# Patient Record
Sex: MALE | Race: BLACK OR AFRICAN AMERICAN | Employment: OTHER | URBAN - METROPOLITAN AREA
[De-identification: names, ages, dates, MRNs, and addresses within clinical notes are randomized per-mention and may not be internally consistent; named-entity substitution may affect disease eponyms.]

---

## 2017-01-11 LAB — HCV AB SER IA-ACNC: NORMAL

## 2017-01-13 ENCOUNTER — OFFICE VISIT (OUTPATIENT)
Dept: INTERNAL MEDICINE CLINIC | Age: 62
End: 2017-01-13

## 2017-01-13 VITALS
WEIGHT: 248 LBS | OXYGEN SATURATION: 95 % | HEIGHT: 71 IN | DIASTOLIC BLOOD PRESSURE: 81 MMHG | SYSTOLIC BLOOD PRESSURE: 126 MMHG | RESPIRATION RATE: 18 BRPM | TEMPERATURE: 97.9 F | HEART RATE: 67 BPM | BODY MASS INDEX: 34.72 KG/M2

## 2017-01-13 DIAGNOSIS — Z23 ENCOUNTER FOR IMMUNIZATION: ICD-10-CM

## 2017-01-13 DIAGNOSIS — Z12.5 SCREENING PSA (PROSTATE SPECIFIC ANTIGEN): ICD-10-CM

## 2017-01-13 DIAGNOSIS — E66.09 NON MORBID OBESITY DUE TO EXCESS CALORIES: ICD-10-CM

## 2017-01-13 DIAGNOSIS — G47.33 OBSTRUCTIVE SLEEP APNEA SYNDROME: ICD-10-CM

## 2017-01-13 DIAGNOSIS — I10 ESSENTIAL HYPERTENSION, BENIGN: Primary | ICD-10-CM

## 2017-01-13 DIAGNOSIS — E78.00 PURE HYPERCHOLESTEROLEMIA: ICD-10-CM

## 2017-01-13 NOTE — PROGRESS NOTES
Chief Complaint   Patient presents with    Results     review    Hypertension     follow up    Cholesterol Problem     follow up    Hip Pain     left     Shoulder Pain     left       Patient is here for a follow up visit and lab review. He also complained of pain in the left hip and stated that he strained his lower abdomen. Stated that he strained it while pulling on a deer that he shot. 1. Have you been to the ER, urgent care clinic since your last visit? Hospitalized since your last visit? No    2. Have you seen or consulted any other health care providers outside of the [address] [place] [address] since your last visit? Include any pap smears or colon screening. Roxy    Tao Duarte is a 59 y.o. male who presents for routine immunizations. He denies any symptoms , reactions or allergies that would exclude them from being immunized today. Risks and adverse reactions were discussed and the VIS was given to them. All questions were addressed. He was observed for 5 min post injection. There were no reactions observed.     Suhail Pizarro LPN

## 2017-01-13 NOTE — PATIENT INSTRUCTIONS
Muscle Strain: Care Instructions  Your Care Instructions  A muscle strain happens when you overstretch, or pull, a muscle. It can happen when you exercise or lift something or when you have an accident. Rest and other home care can help the muscle heal.  Follow-up care is a key part of your treatment and safety. Be sure to make and go to all appointments, and call your doctor if you are having problems. Its also a good idea to know your test results and keep a list of the medicines you take. How can you care for yourself at home? · Rest the strained muscle. Do not put weight on it for a day or two. If your doctor advises you to, use crutches or a sling to rest a sore limb. · Put ice or a cold pack on the sore muscle for 10 to 20 minutes at a time to stop swelling. Put a thin cloth between the ice pack and your skin. · Prop up the sore arm or leg on a pillow when you ice it or anytime you sit or lie down during the next 3 days. Try to keep it above the level of your heart. This will help reduce swelling. · Take pain medicines exactly as directed. ¨ If the doctor gave you a prescription medicine for pain, take it as prescribed. ¨ If you are not taking a prescription pain medicine, ask your doctor if you can take an over-the-counter medicine. · Do not do anything that makes the pain worse. Return to exercise gradually as you feel better. When should you call for help? Call your doctor now or seek immediate medical care if:  · You have new severe pain. · Your injured limb is cool or pale or changes color. · You have tingling, weakness, or numbness in your injured limb. · You cannot move the injured area. Watch closely for changes in your health, and be sure to contact your doctor if:  · You cannot put weight on a joint, or it feels unsteady when you walk. · Pain and swelling get worse or do not start to get better after 2 days of home treatment. Where can you learn more?   Go to http://tristan-marina.info/. Enter O064 in the search box to learn more about \"Muscle Strain: Care Instructions. \"  Current as of: May 23, 2016  Content Version: 11.1  © 9400-6769 [email or web address]. Care instructions adapted under license by [employer] (which disclaims liability or warranty for this information). If you have questions about a medical condition or this instruction, always ask your healthcare professional. [place] [address] any warranty or liability for your use of this information. Vaccine Information Statement    Influenza (Flu) Vaccine (Inactivated or Recombinant): What you need to know    Many Vaccine Information Statements are available in [place] and other languages. See www.immunize.org/vis  Hojas de Información Sobre Vacunas están disponibles en Español y en muchos otros idiomas. Visite www.immunize.org/vis    1. Why get vaccinated? Influenza (flu) is a contagious disease that spreads around the [facility] every year, usually between October and May. Flu is caused by influenza viruses, and is spread mainly by coughing, sneezing, and close contact. Anyone can get flu. Flu strikes suddenly and can last several days. Symptoms vary by age, but can include:   fever/chills   sore throat   muscle aches   fatigue   cough   headache    runny or stuffy nose    Flu can also lead to pneumonia and blood infections, and cause diarrhea and seizures in children. If you have a medical condition, such as heart or lung disease, flu can make it worse. Flu is more dangerous for some people. Infants and young children, people 72years of age and older, pregnant women, and people with certain health conditions or a weakened immune system are at greatest risk. Each year thousands of people in the [facility] die from flu, and many more are hospitalized.      Flu vaccine can:   keep you from getting flu,   make flu less severe if you do get it, and   keep you from spreading flu to your family and other people. 2. Inactivated and recombinant flu vaccines    A dose of flu vaccine is recommended every flu season. Children 6 months through 6years of age may need two doses during the same flu season. Everyone else needs only one dose each flu season. Some inactivated flu vaccines contain a very small amount of a mercury-based preservative called thimerosal. Studies have not shown thimerosal in vaccines to be harmful, but flu vaccines that do not contain thimerosal are available. There is no live flu virus in flu shots. They cannot cause the flu. There are many flu viruses, and they are always changing. Each year a new flu vaccine is made to protect against three or four viruses that are likely to cause disease in the upcoming flu season. But even when the vaccine doesnt exactly match these viruses, it may still provide some protection    Flu vaccine cannot prevent:   flu that is caused by a virus not covered by the vaccine, or   illnesses that look like flu but are not. It takes about 2 weeks for protection to develop after vaccination, and protection lasts through the flu season. 3. Some people should not get this vaccine    Tell the person who is giving you the vaccine:     If you have any severe, life-threatening allergies. If you ever had a life-threatening allergic reaction after a dose of flu vaccine, or have a severe allergy to any part of this vaccine, you may be advised not to get vaccinated. Most, but not all, types of flu vaccine contain a small amount of egg protein.  If you ever had Guillain-Barré Syndrome (also called GBS). Some people with a history of GBS should not get this vaccine. This should be discussed with your doctor.  If you are not feeling well.     It is usually okay to get flu vaccine when you have a mild illness, but you might be asked to come back when you feel better. 4. Risks of a vaccine reaction    With any medicine, including vaccines, there is a chance of reactions. These are usually mild and go away on their own, but serious reactions are also possible. Most people who get a flu shot do not have any problems with it. Minor problems following a flu shot include:    soreness, redness, or swelling where the shot was given     hoarseness   sore, red or itchy eyes   cough   fever   aches   headache   itching   fatigue  If these problems occur, they usually begin soon after the shot and last 1 or 2 days. More serious problems following a flu shot can include the following:     There may be a small increased risk of Guillain-Barré Syndrome (GBS) after inactivated flu vaccine. This risk has been estimated at 1 or 2 additional cases per million people vaccinated. This is much lower than the risk of severe complications from flu, which can be prevented by flu vaccine.  Young children who get the flu shot along with pneumococcal vaccine (PCV13) and/or DTaP vaccine at the same time might be slightly more likely to have a seizure caused by fever. Ask your doctor for more information. Tell your doctor if a child who is getting flu vaccine has ever had a seizure. Problems that could happen after any injected vaccine:      People sometimes faint after a medical procedure, including vaccination. Sitting or lying down for about 15 minutes can help prevent fainting, and injuries caused by a fall. Tell your doctor if you feel dizzy, or have vision changes or ringing in the ears.  Some people get severe pain in the shoulder and have difficulty moving the arm where a shot was given. This happens very rarely.  Any medication can cause a severe allergic reaction. Such reactions from a vaccine are very rare, estimated at about 1 in a million doses, and would happen within a few minutes to a few hours after the vaccination.     As with any medicine, there is a very remote chance of a vaccine causing a serious injury or death. The safety of vaccines is always being monitored. For more information, visit: www.cdc.gov/vaccinesafety/    5. What if there is a serious reaction? What should I look for?  Look for anything that concerns you, such as signs of a severe allergic reaction, very high fever, or unusual behavior. Signs of a severe allergic reaction can include hives, swelling of the face and throat, difficulty breathing, a fast heartbeat, dizziness, and weakness - usually within a few minutes to a few hours after the vaccination. What should I do?  If you think it is a severe allergic reaction or other emergency that cant wait, call 9-1-1 and get the person to the nearest hospital. Otherwise, call your doctor.  Reactions should be reported to the Vaccine Adverse Event Reporting System (VAERS). Your doctor should file this report, or you can do it yourself through  the VAERS web site at www.vaers. hhs.gov, or by calling 7-308.265.4162. VAERS does not give medical advice. 6. The National Vaccine Injury Compensation Program    The [facility] Vaccine Injury Compensation Program (VICP) is a federal program that was created to compensate people who may have been injured by certain vaccines. Persons who believe they may have been injured by a vaccine can learn about the program and about filing a claim by calling 5-404.490.3502 or visiting the [employer] website at www.[facility].gov/vaccinecompensation. There is a time limit to file a claim for compensation. 7. How can I learn more?  Ask your healthcare provider. He or she can give you the vaccine package insert or suggest other sources of information.  Call your local or state health department.    Contact the Centers for Disease Control and Prevention (CDC):  - Call 6-718.586.9224 (1-800-CDC-INFO) or  - Visit CDCs website at www.cdc.gov/flu    Vaccine Information Statement Inactivated Influenza Vaccine   8/7/2015  42 BECKIE Morley [id number]    Department of Health and Human Services  Centers for Disease Control and Prevention    Office Use Only

## 2017-01-13 NOTE — MR AVS SNAPSHOT
Visit Information Date & Time Provider Department Dept. Phone Encounter #  
 1/13/2017  2:15 PM Adilia Parikh MD [facility] [phone] Follow-up Instructions Return in about 4 months (around 5/13/2017) for labs 1 week before. Your Appointments 5/16/2017  1:00 PM  
Follow Up with Germán Patrick DO [facility] ([facility]) Appt Note: 8 months [place] [id number]  
[phone] [address] Upcoming Health Maintenance Date Due INFLUENZA AGE 9 TO ADULT 8/1/2016 COLONOSCOPY 12/8/2024 DTaP/Tdap/Td series (2 - Td) 12/19/2024 Allergies as of 1/13/2017  Review Complete On: 1/13/2017 By: Adilia Parikh MD  
  
 Severity Noted Reaction Type Reactions Tetracycline  07/02/2010    Itching Current Immunizations  Reviewed on 6/1/2016 Name Date Influenza Vaccine 12/19/2014 Influenza Vaccine (Quad) PF 12/4/2015 Influenza Vaccine PF 11/1/2013 Tdap 12/19/2014 Zoster Vaccine, Live 5/31/2016 Not reviewed this visit You Were Diagnosed With   
  
 Codes Comments Essential hypertension, benign    -  Primary ICD-10-CM: I10 
ICD-9-CM: 401.1 Pure hypercholesterolemia     ICD-10-CM: E78.00 ICD-9-CM: 272.0 Obstructive sleep apnea syndrome     ICD-10-CM: G47.33 
ICD-9-CM: 327.23 Non morbid obesity due to excess calories     ICD-10-CM: E66.09 
ICD-9-CM: 278.00 Vitals BP Pulse Temp Resp Height(growth percentile) Weight(growth percentile) 126/81 (BP 1 Location: Left arm, BP Patient Position: Sitting) 67 97.9 °F (36.6 °C) (Oral) 18 5' 11\" (1.803 m) 248 lb (112.5 kg) SpO2 BMI Smoking Status 95% 34.59 kg/m2 Former Smoker Vitals History BMI and BSA Data Body Mass Index Body Surface Area 34.59 kg/m 2 2.37 m 2 Preferred Pharmacy Pharmacy Name Phone [address], [address] [facility] [phone] Your Updated Medication List  
  
   
This list is accurate as of: 1/13/17  2:40 PM.  Always use your most recent med list.  
  
  
  
  
 atorvastatin 80 mg tablet Commonly known as:  LIPITOR  
TAKE 1 TABLET BY MOUTH DAILY  
  
 fluticasone 50 mcg/actuation nasal spray Commonly known as:  FLONASE  
1 spray to each nostril daily  
  
 ibuprofen 600 mg tablet Commonly known as:  MOTRIN Take 600 mg by mouth as needed for Pain. lisinopril-hydroCHLOROthiazide 20-25 mg per tablet Commonly known as:  Kaitlyn Mcardle Take 1 Tab by mouth daily. Follow-up Instructions Return in about 4 months (around 5/13/2017) for labs 1 week before. Patient Instructions Muscle Strain: Care Instructions Your Care Instructions A muscle strain happens when you overstretch, or pull, a muscle. It can happen when you exercise or lift something or when you have an accident. Rest and other home care can help the muscle heal. 
Follow-up care is a key part of your treatment and safety. Be sure to make and go to all appointments, and call your doctor if you are having problems. Its also a good idea to know your test results and keep a list of the medicines you take. How can you care for yourself at home? · Rest the strained muscle. Do not put weight on it for a day or two. If your doctor advises you to, use crutches or a sling to rest a sore limb. · Put ice or a cold pack on the sore muscle for 10 to 20 minutes at a time to stop swelling. Put a thin cloth between the ice pack and your skin. · Prop up the sore arm or leg on a pillow when you ice it or anytime you sit or lie down during the next 3 days. Try to keep it above the level of your heart. This will help reduce swelling. · Take pain medicines exactly as directed. ¨ If the doctor gave you a prescription medicine for pain, take it as prescribed. ¨ If you are not taking a prescription pain medicine, ask your doctor if you can take an over-the-counter medicine. · Do not do anything that makes the pain worse. Return to exercise gradually as you feel better. When should you call for help? Call your doctor now or seek immediate medical care if: 
· You have new severe pain. · Your injured limb is cool or pale or changes color. · You have tingling, weakness, or numbness in your injured limb. · You cannot move the injured area. Watch closely for changes in your health, and be sure to contact your doctor if: 
· You cannot put weight on a joint, or it feels unsteady when you walk. · Pain and swelling get worse or do not start to get better after 2 days of home treatment. Where can you learn more? Go to http://tristan-marina.info/. Enter I717 in the search box to learn more about \"Muscle Strain: Care Instructions. \" Current as of: May 23, 2016 Content Version: 11.1 © 7298-6578 [employer]. Care instructions adapted under license by [employer] (which disclaims liability or warranty for this information). If you have questions about a medical condition or this instruction, always ask your healthcare professional. [address] any warranty or liability for your use of this information. Introducing [facility]! Dear Casey Fabian: Thank you for requesting a Vastech account. Our records indicate that you already have an active Vastech account. You can access your account anytime at [email or web address]. [email or web address] Did you know that you can access your hospital and ER discharge instructions at any time in Vastech? You can also review all of your test results from your hospital stay or ER visit. Additional Information If you have questions, please visit the Frequently Asked Questions section of the [facility] website at [email or web address] [email or web address] Remember, [facility] is NOT to be used for urgent needs. For medical emergencies, dial 911. Now available from your iPhone and Android! Please provide this summary of care documentation to your next provider. Your primary care clinician is listed as LAYA CALABRESE. If you have any questions after today's visit, please call [phone].

## 2017-01-13 NOTE — PROGRESS NOTES
Subjective:       Chief Complaint  The patient presents for follow up of hypertension and high cholesterol. Obesity, CAD         HPI  Tao Handy is a 59 y.o. male seen for follow up of hyperlipidemia. Healso has hypertension. Hypertension well controlled, no significant medication side effects noted, on Zestoretic, hyperlipidemia borderline controlled, no significant medication side effects noted, on Lipitor 80 mg but with pt having CAC score 331 he needs to get LDL<70. Pt has stopped tobacco use. Pt has been missing doses of his Lipitor. He will try be be more consistent with aim to get LDL<70. Diet and Lifestyle: not attempting to follow a low fat, low cholesterol diet, exercises sporadically    Home BP Monitoring: is not measured at home. Other symptoms and concerns: pt will continue work on losing weight to improve his health. Will continue to monitor pulmonary nodule on CT chest.      Discussed the patient's BMI with him. The BMI follow up plan is as follows: BMI is out of normal parameters and plan is as follows: I have counseled this patient on diet and exercise regimens. Current Outpatient Prescriptions   Medication Sig    atorvastatin (LIPITOR) 80 mg tablet TAKE 1 TABLET BY MOUTH DAILY    fluticasone (FLONASE) 50 mcg/actuation nasal spray 1 spray to each nostril daily    lisinopril-hydrochlorothiazide (PRINZIDE, ZESTORETIC) 20-25 mg per tablet Take 1 Tab by mouth daily.  ibuprofen (MOTRIN) 600 mg tablet Take 600 mg by mouth as needed for Pain. No current facility-administered medications for this visit.               Review of Systems  Respiratory: negative for dyspnea on exertion  Cardiovascular: negative for chest pain    Objective:     Visit Vitals    /81 (BP 1 Location: Left arm, BP Patient Position: Sitting)    Pulse 67    Temp 97.9 °F (36.6 °C) (Oral)    Resp 18    Ht 5' 11\" (1.803 m)    Wt 248 lb (112.5 kg)    SpO2 95%    BMI 34.59 kg/m2        General appearance - alert, well appearing, and in no distress  Neck - supple, no significant adenopathy, carotids upstroke normal bilaterally, no bruits  Chest - clear to auscultation, no wheezes, rales or rhonchi, symmetric air entry  Heart - normal rate, regular rhythm, normal S1, S2, no murmurs, rubs, clicks or gallops  Extremities - peripheral pulses normal, no pedal edema, no clubbing or cyanosis  Skin - normal coloration and turgor, no rashes, no suspicious skin lesions noted      Labs:   Lab Results   Component Value Date/Time    Cholesterol, total 184 01/10/2017 09:34 AM    HDL Cholesterol 42 01/10/2017 09:34 AM    LDL, calculated 122 01/10/2017 09:34 AM    Triglyceride 97 01/10/2017 09:34 AM    CHOL/HDL Ratio 3.1 12/04/2015 09:42 AM     Lab Results   Component Value Date/Time    ALT 32 01/10/2017 09:34 AM    AST 23 01/10/2017 09:34 AM    Alk. phosphatase 72 01/10/2017 09:34 AM    Bilirubin, total 0.5 01/10/2017 09:34 AM     Lab Results   Component Value Date/Time    GFR est AA >60 12/04/2015 09:42 AM    GFR est non-AA >60 12/04/2015 09:42 AM    Creatinine 0.9 01/10/2017 09:34 AM    BUN 14 01/10/2017 09:34 AM    Sodium 143 01/10/2017 09:34 AM    Potassium 4.1 01/10/2017 09:34 AM    Chloride 101 01/10/2017 09:34 AM    CO2 23 01/10/2017 09:34 AM            Assessment / Plan     Hypertension well controlled, on current meds  Hyperlipidemia borderline controlled, on Lipitor 80 mg daily. Pt will try to be as compliant as possible and improve diet and exercise. ICD-10-CM ICD-9-CM    1. Essential hypertension, benign M68 684.7 METABOLIC PANEL, COMPREHENSIVE   2. Pure hypercholesterolemia E78.00 272.0 LIPID PANEL   3. Non morbid obesity due to excess calories E66.09 278.00 Will try and improve with diet and increasing exercise. 4. Obstructive sleep apnea syndrome G47.33 327.23 Controlled on CPAP   5. Encounter for immunization Z23 V03.89 INFLUENZA VIRUS VAC QUAD,SPLIT,PRESV FREE SYRINGE 3/> YRS IM   6.  Screening PSA (prostate specific antigen) Z12.5 V76.44 PSA DIAGNOSTIC (PROSTATIC SPECIFIC AG)               Low cholesterol diet, weight control and daily exercise discussed. Follow-up Disposition:  Return in about 4 months (around 5/13/2017) for labs 1 week before. Reviewed plan of care. Patient has provided input and agrees with goals.

## 2017-01-15 DIAGNOSIS — Z12.5 SCREENING PSA (PROSTATE SPECIFIC ANTIGEN): ICD-10-CM

## 2017-01-23 RX ORDER — LISINOPRIL AND HYDROCHLOROTHIAZIDE 20; 25 MG/1; MG/1
TABLET ORAL
Qty: 90 TAB | Refills: 3 | Status: SHIPPED | OUTPATIENT
Start: 2017-01-23 | End: 2018-05-08 | Stop reason: SDUPTHER

## 2017-05-06 LAB — PSA SERPL-MCNC: 1.29 NG/ML

## 2017-05-16 ENCOUNTER — OFFICE VISIT (OUTPATIENT)
Dept: CARDIOLOGY CLINIC | Age: 62
End: 2017-05-16

## 2017-05-16 VITALS
DIASTOLIC BLOOD PRESSURE: 80 MMHG | BODY MASS INDEX: 35.28 KG/M2 | WEIGHT: 252 LBS | OXYGEN SATURATION: 97 % | HEART RATE: 62 BPM | SYSTOLIC BLOOD PRESSURE: 136 MMHG | HEIGHT: 71 IN

## 2017-05-16 DIAGNOSIS — I10 ESSENTIAL HYPERTENSION, BENIGN: Primary | ICD-10-CM

## 2017-05-16 DIAGNOSIS — E78.00 PURE HYPERCHOLESTEROLEMIA: ICD-10-CM

## 2017-05-16 DIAGNOSIS — I71.20 THORACIC AORTIC ANEURYSM WITHOUT RUPTURE: ICD-10-CM

## 2017-05-16 NOTE — PROGRESS NOTES
HPI: I saw Kaela Blantonch in my office today in cardiovascular evaluation regarding an abnormal CT of the chest that he's had recently. Mr. Abisai Jiang is a pleasant 59year old gentleman with history of hypertension, hyperlipidemia, and an abnormal coronary artery calcium score at 331 completed recently, along with some mild ascending aortic dilatation. He has historically been a smoker, but quit smoking within the last couple of months. He did have a CT scan on 12/7/2015 and again on 12/23/2015. The initial CT was for a calcium score which was 331, and the second CT was for further evaluation of some chest nodules. Both CTs demonstrated mildly dilated ascending aorta at 4.3 x 4.3 cm at largest dimension, suggesting a very early ascending aortic aneurysm. He has some mild hypercholesterolemia, for which he has been on Lipitor, but his latest lipid profile completed on 5/8/2017 showed an LDL of 102 and in view of his moderately elevated calcium score we would certainly like to see it much lower preferably under 70 if possible. He tells me that he has been taking his Lipitor 80 mg daily most days, but has not been very careful about his diet. He has some pain in his hands possibly from early arthritis, but is really not having any cardiovascular symptomatology. Encounter Diagnoses   Name Primary?  Pure hypercholesterolemia     Essential hypertension, benign Yes    Thoracic aortic aneurysm without rupture ([address]        Discussion: This gentleman appears to be doing reasonably well from a cardiovascular vantage. He is not having any symptoms to suggest development of symptomatic obstructive coronary disease or any heart failure issues.     He does have history of an ascending aortic aneurysm which is only about 4.3 cm when last checked in his CT in July 2016 and I do not think we have to check that again at this time since it really had not changed in about 8 months from his previous CT, but I am going to discuss possibly doing an MRI or CT when I see him again in about 8 months to reevaluate this area. He does have significant hypercholesterolemia as indicated above and I have encouraged him to follow-up with Dr. Jorge Leonardo and to further discuss adjustment of his diet and getting involved in some exercise and if that does not improve significantly when it is next checked I would consider adding Zetia to his regimen. His blood pressure appears to be adequately controlled and his EKG is completely normal so I am going to plan to see him again in several months or as needed if new symptoms surface in the interim. PCP: Amie Zuñiga MD      Past Medical History:   Diagnosis Date    Agatston CAC score 200-399 12/03/2015    Coronary calcium score 331.  Essential hypertension, benign     Pure hypercholesterolemia     Tobacco dependency        Past Surgical History:   Procedure Laterality Date    HX OTHER SURGICAL Right 2011    bone spur       Current Outpatient Rx   Name  Route  Sig  Dispense  Refill    lisinopril-hydrochlorothiazide (PRINZIDE, ZESTORETIC) 20-25 mg per tablet    Oral    Take 1 Tab by mouth daily. 90 Tab    3      atorvastatin (LIPITOR) 80 mg tablet    Oral    Take 1 Tab by mouth daily. 90 Tab    3      ibuprofen (MOTRIN) 600 mg tablet    Oral    Take 600 mg by mouth as needed for Pain.               fluticasone (FLONASE) 50 mcg/actuation nasal spray        1 spray to each nostril daily    1 Bottle    3         Allergies   Allergen Reactions    Tetracycline Itching       Social History :  Social History   Substance Use Topics    Smoking status: Former Smoker     Packs/day: 1.00     Years: 20.00     Types: Cigarettes     Quit date: 1/2/2016    Smokeless tobacco: Never Used    Alcohol use Yes      Comment: occassionally        Family History: family history includes Diabetes in his brother and father; Heart Attack in his brother; Heart Failure in his father; Kidney Disease in his mother; Stroke in his brother. Review of Systems:    Constitutional: Negative. Respiratory: Negative. Cardiovascular: Negative. Gastrointestinal: Negative. Musculoskeletal: Positive for joint pain and myalgias. Negative for back pain, falls and neck pain. Physical Exam:    The patient is a cooperative, alert, well developed, well nourished 59 y.o. dark skinned  male who is in no acute distress at the time of the examination. Visit Vitals    /80    Pulse 62    Ht 5' 11\" (1.803 m)    Wt 114.3 kg (252 lb)    SpO2 97%    BMI 35.15 kg/m2       HEENT: Conjuctiva white, mucosa moist, no pallor or cyanosis. NECK: Supple without masses, tenderness or thyromegaly. There was no jugular venous distention. Carotid are full bilaterally without bruits. CHEST: Symmetrical with good excursion. LUNGS: Clear to auscultation in all fields. HEART: The apex is not displaced. There were no lifts, thrills or heaves. There is a normal S1 and S2 without appreciable murmurs, rubs, clicks, or gallops auscultated. ABDOMEN: Soft without masses, tenderness or organomegaly. EXTREMITIES: Full peripheral pulses without peripheral edema. INTEGUMENT: Warm and dry   NEUROLOGICAL: The patient is oriented x 3 with motor function grossly intact. Review of Data: See PMH and Cardiology and Imaging sections for cardiac testing  Lab Results   Component Value Date/Time    Cholesterol, total 159 05/05/2017 08:54 AM    HDL Cholesterol 41 05/05/2017 08:54 AM    LDL, calculated 102 05/05/2017 08:54 AM    Triglyceride 78 05/05/2017 08:54 AM    CHOL/HDL Ratio 3.1 12/04/2015 09:42 AM       Results for orders placed or performed in visit on 05/16/17   AMB POC EKG ROUTINE W/ 12 LEADS, INTER & REP     Status: None    Narrative    Normal sinus rhythm rate 62. This EKG is within normal limits and similar to the EKG of September 19, 2016. Clemente Davis D.O., F.A.C.C.   [facility]  [facility] [place]  [address] [facility]. [address] [address] [address]    PLEASE NOTE:  This document has been produced using voice recognition software. Unrecognized errors in transcription may be present.

## 2017-05-16 NOTE — PROGRESS NOTES
Review of Systems   Constitutional: Negative. Respiratory: Negative. Cardiovascular: Negative. Gastrointestinal: Negative. Musculoskeletal: Positive for joint pain and myalgias. Negative for back pain, falls and neck pain.

## 2017-05-16 NOTE — MR AVS SNAPSHOT
Visit Information Date & Time Provider Department Dept. Phone Encounter #  
 5/16/2017  1:00 PM Matteo Lazaro, [address] [facility] [id number] Follow-up Instructions Return in about 8 months (around 1/16/2018), or if symptoms worsen or fail to improve. Your Appointments 5/19/2017  8:30 AM  
ROUTINE CARE with Lacey Batres MD  
[facility]ists at [facility] (--) Appt Note: 4 mo f/u  
 [address] [place] [id number] [phone]  
  
   
 [address] [address] Upcoming Health Maintenance Date Due INFLUENZA AGE 9 TO ADULT 8/1/2017 COLONOSCOPY 12/8/2024 DTaP/Tdap/Td series (2 - Td) 12/19/2024 Allergies as of 5/16/2017  Review Complete On: 5/16/2017 By: Matteo Lazaro DO Severity Noted Reaction Type Reactions Tetracycline  07/02/2010    Itching Current Immunizations  Reviewed on 6/1/2016 Name Date Influenza Vaccine 12/19/2014 Influenza Vaccine (Quad) PF 1/13/2017  2:52 PM, 12/4/2015 Influenza Vaccine PF 11/1/2013 Tdap 12/19/2014 Zoster Vaccine, Live 5/31/2016 Not reviewed this visit You Were Diagnosed With   
  
 Codes Comments Essential hypertension, benign    -  Primary ICD-10-CM: I10 
ICD-9-CM: 401.1 Pure hypercholesterolemia     ICD-10-CM: E78.00 ICD-9-CM: 272.0 Vitals BP Pulse Height(growth percentile) Weight(growth percentile) SpO2 BMI  
 136/80 62 5' 11\" (1.803 m) 252 lb (114.3 kg) 97% 35.15 kg/m2 Smoking Status Former Smoker Vitals History BMI and BSA Data Body Mass Index Body Surface Area  
 35.15 kg/m 2 2.39 m 2 Preferred Pharmacy Pharmacy Name Phone [address], [address] [place] [address] [phone] Your Updated Medication List  
  
   
This list is accurate as of: 5/16/17  1:36 PM.  Always use your most recent med list.  
  
  
  
  
 atorvastatin 80 mg tablet Commonly known as:  LIPITOR  
TAKE 1 TABLET BY MOUTH DAILY  
  
 fluticasone 50 mcg/actuation nasal spray Commonly known as:  FLONASE  
1 spray to each nostril daily  
  
 ibuprofen 600 mg tablet Commonly known as:  MOTRIN Take 600 mg by mouth as needed for Pain. lisinopril-hydroCHLOROthiazide 20-25 mg per tablet Commonly known as:  PRINZIDE, ZESTORETIC  
TAKE 1 TABLET BY MOUTH DAILY We Performed the Following AMB POC EKG ROUTINE W/ 12 LEADS, INTER & REP [85084 CPT(R)] Follow-up Instructions Return in about 8 months (around 1/16/2018), or if symptoms worsen or fail to improve. Introducing [facility] & HEALTH SERVICES! Dear Navin Ambrocio: Thank you for requesting a SnappyTV account. Our records indicate that you already have an active SnappyTV account. You can access your account anytime at [email or web address] Did you know that you can access your hospital and ER discharge instructions at any time in SnappyTV? You can also review all of your test results from your hospital stay or ER visit. Additional Information If you have questions, please visit the Frequently Asked Questions section of the SnappyTV website at [email or web address] Remember, SnappyTV is NOT to be used for urgent needs. For medical emergencies, dial 911. Now available from your iPhone and Android! Please provide this summary of care documentation to your next provider. Your primary care clinician is listed as LAYA CALABRESE. If you have any questions after today's visit, please call [phone].

## 2017-05-16 NOTE — PROGRESS NOTES
1. Have you been to the ER, urgent care clinic since your last visit? Hospitalized since your last visit? No     2. Have you seen or consulted any other health care providers outside of the [facility] since your last visit? Include any pap smears or colon screening.  No

## 2017-05-19 ENCOUNTER — OFFICE VISIT (OUTPATIENT)
Dept: INTERNAL MEDICINE CLINIC | Age: 62
End: 2017-05-19

## 2017-05-19 VITALS
BODY MASS INDEX: 35 KG/M2 | SYSTOLIC BLOOD PRESSURE: 132 MMHG | HEART RATE: 75 BPM | DIASTOLIC BLOOD PRESSURE: 74 MMHG | WEIGHT: 250 LBS | OXYGEN SATURATION: 98 % | HEIGHT: 71 IN | TEMPERATURE: 98.7 F | RESPIRATION RATE: 20 BRPM

## 2017-05-19 DIAGNOSIS — E66.09 NON MORBID OBESITY DUE TO EXCESS CALORIES: ICD-10-CM

## 2017-05-19 DIAGNOSIS — E78.00 PURE HYPERCHOLESTEROLEMIA: ICD-10-CM

## 2017-05-19 DIAGNOSIS — I10 ESSENTIAL HYPERTENSION, BENIGN: Primary | ICD-10-CM

## 2017-05-19 NOTE — MR AVS SNAPSHOT
Visit Information Date & Time Provider Department Dept. Phone Encounter #  
 5/19/2017  8:30 AM Robbie Erazo MD [facility] at [facility] [employer] [id number] Follow-up Instructions Return in about 4 months (around 9/19/2017) for labs 1 week before. Your Appointments 5/31/2017  4:10 PM  
Follow Up with Nikia Joyner MD  
[address] [facility], [address] and [facility] ([address]) Appt Note: lt shoulder pain [address], [address] [facility]  
[phone] [address] [place]  
  
    
 1/8/2018  1:20 PM  
Follow Up with Masha Marley DO [facility] ([address]) Appt Note: 8 mth f/up [place] [address]  
[phone] [address] [facility] [address] Upcoming Health Maintenance Date Due INFLUENZA AGE 9 TO ADULT 8/1/2017 COLONOSCOPY 12/8/2024 DTaP/Tdap/Td series (2 - Td) 12/19/2024 Allergies as of 5/19/2017  Review Complete On: 5/19/2017 By: Robbie Erazo MD  
  
 Severity Noted Reaction Type Reactions Tetracycline  07/02/2010    Itching Current Immunizations  Reviewed on 6/1/2016 Name Date Influenza Vaccine 12/19/2014 Influenza Vaccine (Quad) PF 1/13/2017  2:52 PM, 12/4/2015 Influenza Vaccine PF 11/1/2013 Tdap 12/19/2014 Zoster Vaccine, Live 5/31/2016 Not reviewed this visit You Were Diagnosed With   
  
 Codes Comments Essential hypertension, benign    -  Primary ICD-10-CM: I10 
ICD-9-CM: 401.1 Pure hypercholesterolemia     ICD-10-CM: E78.00 ICD-9-CM: 272.0 Vitals BP Pulse Temp Resp Height(growth percentile) Weight(growth percentile) 132/74 (BP 1 Location: Left arm, BP Patient Position: Sitting) 75 98.7 °F (37.1 °C) (Oral) 20 5' 11\" (1.803 m) 250 lb (113.4 kg) SpO2 BMI Smoking Status 98% 34.87 kg/m2 Former Smoker Vitals History BMI and BSA Data Body Mass Index Body Surface Area 34.87 kg/m 2 2.38 m 2 Preferred Pharmacy Pharmacy Name Phone [address], [address] [facility] [phone] Your Updated Medication List  
  
   
This list is accurate as of: 5/19/17  9:12 AM.  Always use your most recent med list.  
  
  
  
  
 atorvastatin 80 mg tablet Commonly known as:  LIPITOR  
TAKE 1 TABLET BY MOUTH DAILY  
  
 fluticasone 50 mcg/actuation nasal spray Commonly known as:  FLONASE  
1 spray to each nostril daily  
  
 ibuprofen 600 mg tablet Commonly known as:  MOTRIN Take 600 mg by mouth as needed for Pain. lisinopril-hydroCHLOROthiazide 20-25 mg per tablet Commonly known as:  PRINZIDE, ZESTORETIC  
TAKE 1 TABLET BY MOUTH DAILY Follow-up Instructions Return in about 4 months (around 9/19/2017) for labs 1 week before. Patient Instructions Advance Care Planning: Care Instructions Your Care Instructions It can be hard to live with an illness that cannot be cured. But if your health is getting worse, you may want to make decisions about end-of-life care. Planning for the end of your life does not mean that you are giving up. It is a way to make sure that your wishes are met. Clearly stating your wishes can make it easier for your loved ones. Making plans while you are still able may also ease your mind and make your final days less stressful and more meaningful. Follow-up care is a key part of your treatment and safety. Be sure to make and go to all appointments, and call your doctor if you are having problems. It's also a good idea to know your test results and keep a list of the medicines you take. What can you do to plan for the end of life? · You can bring these issues up with your doctor.  You do not need to wait until your doctor starts the conversation. You might start with \"I would not be willing to live with . Shelvy Setting Shelvy Setting Shelvy Setting \" When you complete this sentence it helps your doctor understand your wishes. · Talk openly and honestly with your doctor. This is the best way to understand the decisions you will need to make as your health changes. Know that you can always change your mind. · Ask your doctor about commonly used life-support measures. These include tube feedings, breathing machines, and fluids given through a vein (IV). Understanding these treatments will help you decide whether you want them. · You may choose to have these life-supporting treatments for a limited time. This allows a trial period to see whether they will help you. You may also decide that you want your doctor to take only certain measures to keep you alive. It is important to spell out these conditions so that your doctor and family understand them. · Talk to your doctor about how long you are likely to live. He or she may be able to give you an idea of what usually happens with your specific illness. · Think about preparing papers that state your wishes. This way there will not be any confusion about what you want. You can change your instructions at any time. Which papers should you prepare? Advance directives are legal papers that tell doctors how you want to be cared for at the end of your life. You do not need a  to write these papers. Ask your doctor or your state health department for information on how to write your advance directives. They may have the forms for each of these types of papers. Make sure your doctor has a copy of these on file, and give a copy to a family member or close friend. · Consider a do-not-resuscitate order (DNR). This order asks that no extra treatments be done if your heart stops or you stop breathing.  Extra treatments may include cardiopulmonary resuscitation (CPR), electrical shock to restart your heart, or a machine to breathe for you. If you decide to have a DNR order, ask your doctor to explain and write it. Place the order in your home where everyone can easily see it. · Consider a living will. A living will explains your wishes about life support and other treatments at the end of your life if you become unable to speak for yourself. Living boyle tell doctors to use or not use treatments that would keep you alive. You must have one or two witnesses or a notary present when you sign this form. · Consider a durable power of  for health care. This allows you to name a person to make decisions about your care if you are not able to. Most people ask a close friend or family member. Talk to this person about the kinds of treatments you want and those that you do not want. Make sure this person understands your wishes. These legal papers are simple to change. Tell your doctor what you want to change, and ask him or her to make a note in your medical file. Give your family updated copies of the papers. Where can you learn more? Go to http://tristan-marina.info/. Enter P184 in the search box to learn more about \"Advance Care Planning: Care Instructions. \" Current as of: November 17, 2016 Content Version: 11.2 © 8167-5073 [employer]. Care instructions adapted under license by [employer] (which disclaims liability or warranty for this information). If you have questions about a medical condition or this instruction, always ask your healthcare professional. [place] [address] any warranty or liability for your use of this information. Introducing hospitals & HEALTH SERVICES! Dear Reggie Burks: Thank you for requesting a Warp 9 account. Our records indicate that you already have an active Warp 9 account. You can access your account anytime at [email or web address] Did you know that you can access your hospital and ER discharge instructions at any time in [email or web address]? You can also review all of your test results from your hospital stay or ER visit. Additional Information If you have questions, please visit the Frequently Asked Questions section of the [email or web address] website at [email or web address] Remember, [email or web address] is NOT to be used for urgent needs. For medical emergencies, dial 911. Now available from your iPhone and Android! Please provide this summary of care documentation to your next provider. Your primary care clinician is listed as LAYA CALABRESE. If you have any questions after today's visit, please call [phone].

## 2017-05-19 NOTE — PATIENT INSTRUCTIONS
Advance Care Planning: Care Instructions  Your Care Instructions  It can be hard to live with an illness that cannot be cured. But if your health is getting worse, you may want to make decisions about end-of-life care. Planning for the end of your life does not mean that you are giving up. It is a way to make sure that your wishes are met. Clearly stating your wishes can make it easier for your loved ones. Making plans while you are still able may also ease your mind and make your final days less stressful and more meaningful. Follow-up care is a key part of your treatment and safety. Be sure to make and go to all appointments, and call your doctor if you are having problems. It's also a good idea to know your test results and keep a list of the medicines you take. What can you do to plan for the end of life? · You can bring these issues up with your doctor. You do not need to wait until your doctor starts the conversation. You might start with \"I would not be willing to live with . Arash Jacksno \" When you complete this sentence it helps your doctor understand your wishes. · Talk openly and honestly with your doctor. This is the best way to understand the decisions you will need to make as your health changes. Know that you can always change your mind. · Ask your doctor about commonly used life-support measures. These include tube feedings, breathing machines, and fluids given through a vein (IV). Understanding these treatments will help you decide whether you want them. · You may choose to have these life-supporting treatments for a limited time. This allows a trial period to see whether they will help you. You may also decide that you want your doctor to take only certain measures to keep you alive. It is important to spell out these conditions so that your doctor and family understand them. · Talk to your doctor about how long you are likely to live.  He or she may be able to give you an idea of what usually happens with your specific illness. · Think about preparing papers that state your wishes. This way there will not be any confusion about what you want. You can change your instructions at any time. Which papers should you prepare? Advance directives are legal papers that tell doctors how you want to be cared for at the end of your life. You do not need a  to write these papers. Ask your doctor or your state health department for information on how to write your advance directives. They may have the forms for each of these types of papers. Make sure your doctor has a copy of these on file, and give a copy to a family member or close friend. · Consider a do-not-resuscitate order (DNR). This order asks that no extra treatments be done if your heart stops or you stop breathing. Extra treatments may include cardiopulmonary resuscitation (CPR), electrical shock to restart your heart, or a machine to breathe for you. If you decide to have a DNR order, ask your doctor to explain and write it. Place the order in your home where everyone can easily see it. · Consider a living will. A living will explains your wishes about life support and other treatments at the end of your life if you become unable to speak for yourself. Living boyle tell doctors to use or not use treatments that would keep you alive. You must have one or two witnesses or a notary present when you sign this form. · Consider a durable power of  for health care. This allows you to name a person to make decisions about your care if you are not able to. Most people ask a close friend or family member. Talk to this person about the kinds of treatments you want and those that you do not want. Make sure this person understands your wishes. These legal papers are simple to change. Tell your doctor what you want to change, and ask him or her to make a note in your medical file. Give your family updated copies of the papers.   Where can you learn more?  Go to http://tristan-marina.info/. Enter P184 in the search box to learn more about \"Advance Care Planning: Care Instructions. \"  Current as of: November 17, 2016  Content Version: 11.2  © 1789-1604 [employer]. Care instructions adapted under license by [employer] (which disclaims liability or warranty for this information). If you have questions about a medical condition or this instruction, always ask your healthcare professional. [address] any warranty or liability for your use of this information.

## 2017-05-19 NOTE — PROGRESS NOTES
Subjective:       Chief Complaint  The patient presents for follow up of hypertension and high cholesterol. Obesity, CAD         HPI  Tao Carroll is a 59 y.o. male seen for follow up of hyperlipidemia. Healso has hypertension. Hypertension well controlled, no significant medication side effects noted, on Zestoretic, hyperlipidemia borderline controlled, no significant medication side effects noted, on Lipitor 80 mg but with pt having CAC score 331 he needs to get LDL<70. Pt has stopped tobacco use. Pt has been missing doses of his Lipitor. He will try be be more consistent with aim to get LDL<70. He was advised to get a pill counter. Diet and Lifestyle: not attempting to follow a low fat, low cholesterol diet, exercises sporadically    Home BP Monitoring: is not measured at home. Other symptoms and concerns: pt will continue work on losing weight to improve his health. Discussed the patient's BMI with him. The BMI follow up plan is as follows: BMI is out of normal parameters and plan is as follows: I have counseled this patient on diet and exercise regimens. Current Outpatient Prescriptions   Medication Sig    lisinopril-hydroCHLOROthiazide (PRINZIDE, ZESTORETIC) 20-25 mg per tablet TAKE 1 TABLET BY MOUTH DAILY    atorvastatin (LIPITOR) 80 mg tablet TAKE 1 TABLET BY MOUTH DAILY    fluticasone (FLONASE) 50 mcg/actuation nasal spray 1 spray to each nostril daily    ibuprofen (MOTRIN) 600 mg tablet Take 600 mg by mouth as needed for Pain. No current facility-administered medications for this visit.               Review of Systems  Respiratory: negative for dyspnea on exertion  Cardiovascular: negative for chest pain    Objective:     Visit Vitals    /74 (BP 1 Location: Left arm, BP Patient Position: Sitting)    Pulse 75    Temp 98.7 °F (37.1 °C) (Oral)    Resp 20    Ht 5' 11\" (1.803 m)    Wt 250 lb (113.4 kg)    SpO2 98%    BMI 34.87 kg/m2        General appearance - alert, well appearing, and in no distress  Neck - supple, no significant adenopathy, carotids upstroke normal bilaterally, no bruits  Chest - clear to auscultation, no wheezes, rales or rhonchi, symmetric air entry  Heart - normal rate, regular rhythm, normal S1, S2, no murmurs, rubs, clicks or gallops  Extremities - peripheral pulses normal, no pedal edema, no clubbing or cyanosis  Skin - normal coloration and turgor, no rashes, no suspicious skin lesions noted      Labs:   Lab Results   Component Value Date/Time    Cholesterol, total 159 05/05/2017 08:54 AM    HDL Cholesterol 41 05/05/2017 08:54 AM    LDL, calculated 102 05/05/2017 08:54 AM    Triglyceride 78 05/05/2017 08:54 AM    CHOL/HDL Ratio 3.1 12/04/2015 09:42 AM     Lab Results   Component Value Date/Time    ALT (SGPT) 30 05/05/2017 08:54 AM    AST (SGOT) 30 05/05/2017 08:54 AM    Alk. phosphatase 74 05/05/2017 08:54 AM    Bilirubin, total 0.6 05/05/2017 08:54 AM     Lab Results   Component Value Date/Time    GFR est AA >60 12/04/2015 09:42 AM    GFR est non-AA >60 12/04/2015 09:42 AM    Creatinine 1.1 05/05/2017 08:54 AM    BUN 16 05/05/2017 08:54 AM    Sodium 142 05/05/2017 08:54 AM    Potassium 3.7 05/05/2017 08:54 AM    Chloride 99 05/05/2017 08:54 AM    CO2 21 05/05/2017 08:54 AM            Assessment / Plan     Hypertension well controlled, on current meds  Hyperlipidemia borderline controlled, on Lipitor 80 mg daily. Pt will try to be as compliant as possible and improve diet and exercise. ICD-10-CM ICD-9-CM    1. Essential hypertension, benign D99 571.5 METABOLIC PANEL, COMPREHENSIVE   2. Pure hypercholesterolemia E78.00 272.0 LIPID PANEL   3. Non morbid obesity due to excess calories E66.09 278.00 Pt will continue to work on diet and exercise to lose weight. Low cholesterol diet, weight control and daily exercise discussed. Follow-up Disposition:  Return in about 4 months (around 9/19/2017) for labs 1 week before.       Reviewed plan of care. Patient has provided input and agrees with goals.

## 2017-05-31 ENCOUNTER — OFFICE VISIT (OUTPATIENT)
Dept: ORTHOPEDIC SURGERY | Age: 62
End: 2017-05-31

## 2017-05-31 VITALS
HEIGHT: 71 IN | DIASTOLIC BLOOD PRESSURE: 70 MMHG | BODY MASS INDEX: 35.59 KG/M2 | SYSTOLIC BLOOD PRESSURE: 121 MMHG | WEIGHT: 254.2 LBS | HEART RATE: 60 BPM | TEMPERATURE: 97.8 F

## 2017-05-31 DIAGNOSIS — M19.012 OSTEOARTHRITIS OF LEFT AC (ACROMIOCLAVICULAR) JOINT: Primary | ICD-10-CM

## 2017-05-31 RX ORDER — TRIAMCINOLONE ACETONIDE 40 MG/ML
40 INJECTION, SUSPENSION INTRA-ARTICULAR; INTRAMUSCULAR ONCE
Qty: 1 ML | Refills: 0
Start: 2017-05-31 | End: 2017-05-31

## 2017-05-31 NOTE — PROGRESS NOTES
Tao Leyva  1955   Chief Complaint   Patient presents with    Shoulder Pain     Left        HISTORY OF PRESENT ILLNESS  Edith Tobias is a 59 y.o. male who presents today for reevaluation of left shoulder pain. He rates his pain 0/10 today. [facility]. He is requesting a cortisone injection today. He is not ready to consider surgery. He does not have any pain reaching overhead. He states he still has occasional pain but received a great deal of relief from the cortisone injection. Patient denies any fever, chills, chest pain, shortness of breath or calf pain. There are no new illness or injuries to report since last seen in the office. There are no changes to medications, allergies, family or social history. PHYSICAL EXAM:   Visit Vitals    /70    Pulse 60    Temp 97.8 °F (36.6 °C) (Oral)    Ht 5' 11\" (1.803 m)    Wt 254 lb 3.2 oz (115.3 kg)    BMI 35.45 kg/m2     The patient is a well-developed, well-nourished male   in no acute distress. The patient is alert and oriented times three. The patient is alert and oriented times three. Mood and affect are normal.  LYMPHATIC: lymph nodes are not enlarged and are within normal limits  SKIN: normal in color and non tender to palpation. There are no bruises or abrasions noted. NEUROLOGICAL: Motor sensory exam is within normal limits. Reflexes are equal bilaterally.  There is normal sensation to pinprick and light touch  MUSCULOSKELETAL:  Examination Left shoulder   Skin Intact   AC joint tenderness -   Biceps tenderness -   Forward flexion/Elevation    Active abduction    Glenohumeral abduction 90   External rotation ROM 90   Internal rotation ROM 70   Apprehension -   Jayros Relocation -   Jerk -   Load and Shift -   Obriens -   Speeds -   Impingement sign +   Supraspinatus/Empty Can -, 5/5   External Rotation Strength -, 5/5   Lift Off/Belly Press -, 5/5   Neurovascular Intact        PROCEDURE: After sterile prep, 6 cc of Xylocaine and 1 cc of Kenalog were injected into the left shoulder. [facility] - Krysta Guerra  OFFICE PROCEDURE PROGRESS NOTE        Chart reviewed for the following:  Danielle Stewart MD, have reviewed the History, Physical and updated the Allergic reactions for Tao Leyva     TIME OUT performed immediately prior to start of procedure:  Danielle Stewart MD, have performed the following reviews on Tao Leyva prior to the start of the procedure:            * Patient was identified by name and date of birth   * Agreement on procedure being performed was verified  * Risks and Benefits explained to the patient  * Procedure site verified and marked as necessary  * Patient was positioned for comfort  * Consent was signed and verified     Time: 4:27 PM    Date of procedure: 5/31/2017    Procedure performed by:  Darin Norwood MD    Provider assisted by: (see medication administration)    How tolerated by patient: tolerated the procedure well with no complications    Comments: none      IMAGING: 3 view of the left shoulder dated 9/6/17 was reviewed and read: mild to moderate AC joint degenerative changes    IMPRESSION:      ICD-10-CM ICD-9-CM    1. Osteoarthritis of left AC (acromioclavicular) joint M19.012 715.91 TRIAMCINOLONE ACETONIDE INJ      triamcinolone acetonide (KENALOG) 40 mg/mL injection      DRAIN/INJECT LARGE JOINT/BURSA        PLAN: Patient responded well to a previous cortisone injection. At this time, he is not ready to proceed with surgical intervention and would like to continue with conservative treatment. At the patient's request, I injected the left shoulder with cortisone today. I will see him back in 3 weeks if pain continues.   Follow-up Disposition: Not on File    Scribed by Isa Garcia [address]) as dictated by Darin Norwood MD    I, Dr. Darin Norwood, confirm that all documentation is accurate.     Hattie Owens M.D.   [facility]

## 2017-07-17 ENCOUNTER — OFFICE VISIT (OUTPATIENT)
Dept: INTERNAL MEDICINE CLINIC | Age: 62
End: 2017-07-17

## 2017-07-17 VITALS
HEIGHT: 71 IN | WEIGHT: 248.6 LBS | TEMPERATURE: 98.6 F | BODY MASS INDEX: 34.8 KG/M2 | HEART RATE: 70 BPM | DIASTOLIC BLOOD PRESSURE: 80 MMHG | OXYGEN SATURATION: 99 % | RESPIRATION RATE: 16 BRPM | SYSTOLIC BLOOD PRESSURE: 120 MMHG

## 2017-07-17 DIAGNOSIS — J04.0 LARYNGITIS: Primary | ICD-10-CM

## 2017-07-17 RX ORDER — PREDNISONE 20 MG/1
20 TABLET ORAL
Qty: 10 TAB | Refills: 0 | Status: SHIPPED | OUTPATIENT
Start: 2017-07-17 | End: 2017-08-21

## 2017-07-17 NOTE — PROGRESS NOTES
Chief Complaint   Patient presents with    Sore Throat     horness    Patient is here today for horness x 1wk. Patient sts no sore throat. 1. Have you been to the ER, urgent care clinic since your last visit? Hospitalized since your last visit? No    2. Have you seen or consulted any other health care providers outside of the [address] [place] [address] since your last visit? Include any pap smears or colon screening.  No

## 2017-07-17 NOTE — PROGRESS NOTES
Chief Complaint   Patient presents with    Sore Throat     horness        HPI:  Patient is a 59year old male presents today for an acute visit with hoarseness. He stated that he lost his voice few days ago that has persisted, though he denies fever, chills, sore throat, nausea, vomiting, CP, SOB, cough, dysphagia, odynophagia, and thus came in today for evaluation. Past Medical History:   Diagnosis Date    Agatston CAC score 200-399 12/03/2015    Coronary calcium score 331.  Essential hypertension, benign     Pure hypercholesterolemia     Tobacco dependency      Allergies   Allergen Reactions    Tetracycline Itching     Current Outpatient Prescriptions   Medication Sig Dispense Refill    predniSONE (DELTASONE) 20 mg tablet Take 1 Tab by mouth daily (with breakfast). 10 Tab 0    lisinopril-hydroCHLOROthiazide (PRINZIDE, ZESTORETIC) 20-25 mg per tablet TAKE 1 TABLET BY MOUTH DAILY 90 Tab 3    atorvastatin (LIPITOR) 80 mg tablet TAKE 1 TABLET BY MOUTH DAILY 90 Tab 3    fluticasone (FLONASE) 50 mcg/actuation nasal spray 1 spray to each nostril daily 1 Bottle 3    ibuprofen (MOTRIN) 600 mg tablet Take 600 mg by mouth as needed for Pain.               ROS:  Pertinent as in HPI      Physical Exam:  Visit Vitals    /80 (BP 1 Location: Right arm, BP Patient Position: Sitting)    Pulse 70    Temp 98.6 °F (37 °C) (Oral)    Resp 16    Ht 5' 11\" (1.803 m)    Wt 248 lb 9.6 oz (112.8 kg)    SpO2 99%    BMI 34.67 kg/m2     General: a & o x 3, afebrile, well-nourished, interacting appropriately, in no acute distress  HEENT: Mucosal edema and erythema noted  Neck: supple, symmetrical, no thyromegaly  Respiratory: symmetrical chest expansion, lung sounds clear bilaterally  Cardiovascular: normal S1S2, regular rate and rhythm        Assessment/Plan:    ICD-10-CM ICD-9-CM    1. Laryngitis J04.0 464.00 Prednisone started today and he was advised on hydration, humidification, and to rest his voice.  predniSONE (DELTASONE) 20 mg tablet           Orders Placed This Encounter    predniSONE (DELTASONE) 20 mg tablet     Sig: Take 1 Tab by mouth daily (with breakfast). Dispense:  10 Tab     Refill:  0         Additional Notes: Discussed today's diagnosis, treatment plans. Discussed medication indications and side effects. After Visit Summary: Discussed provided printed patient instructions. Answered questions accordingly.   Follow-up Disposition: As previously scheduled with PCP        Michael Arevalo DO, MPH  Internal Medicine

## 2017-07-17 NOTE — MR AVS SNAPSHOT
Visit Information Date & Time Provider Department Dept. Phone Encounter #  
 7/17/2017  2:30  Junaid Turner DO [facility] 8 [id number] Your Appointments 9/8/2017  8:00 AM  
LAB with Monica Melendez MD  
[facility] (--) Appt Note: 4 mo f/u lab [address] [id number] [phone]  
  
   
 [address] [phone]  
  
    
 9/22/2017  8:30 AM  
ROUTINE CARE with Monica Melendez MD  
[facility] (--) Appt Note: 4 mo f/u  
 [address] [address] [phone]  
  
   
 [address] [phone]  
  
    
 1/8/2018  1:20 PM  
Follow Up with Gerri Jones DO [facility] ([facility]) Appt Note: 8 mth f/up [address] [facility] [id number] [phone] [address] [place] [address] Upcoming Health Maintenance Date Due INFLUENZA AGE 9 TO ADULT 8/1/2017 COLONOSCOPY 12/8/2024 DTaP/Tdap/Td series (2 - Td) 12/19/2024 Allergies as of 7/17/2017  Review Complete On: 7/17/2017 By: Jacinto Kohli LPN Severity Noted Reaction Type Reactions Tetracycline  07/02/2010    Itching Current Immunizations  Reviewed on 6/1/2016 Name Date Influenza Vaccine 12/19/2014 Influenza Vaccine (Quad) PF 1/13/2017  2:52 PM, 12/4/2015 Influenza Vaccine PF 11/1/2013 Tdap 12/19/2014 Zoster Vaccine, Live 5/31/2016 Not reviewed this visit You Were Diagnosed With   
  
 Codes Comments Laryngitis    -  Primary ICD-10-CM: J04.0 ICD-9-CM: 464.00 Vitals BP Pulse Temp Resp Height(growth percentile) Weight(growth percentile) 120/80 (BP 1 Location: Right arm, BP Patient Position: Sitting) 70 98.6 °F (37 °C) (Oral) 16 5' 11\" (1.803 m) 248 lb 9.6 oz (112.8 kg) SpO2 BMI Smoking Status 99% 34.67 kg/m2 Former Smoker BMI and BSA Data Body Mass Index Body Surface Area  
 34.67 kg/m 2 2.38 m 2 Preferred Pharmacy Pharmacy Name Phone [address], [address] [facility] [phone] Your Updated Medication List  
  
   
This list is accurate as of: 7/17/17  3:19 PM.  Always use your most recent med list.  
  
  
  
  
 atorvastatin 80 mg tablet Commonly known as:  LIPITOR  
TAKE 1 TABLET BY MOUTH DAILY  
  
 fluticasone 50 mcg/actuation nasal spray Commonly known as:  FLONASE  
1 spray to each nostril daily  
  
 ibuprofen 600 mg tablet Commonly known as:  MOTRIN Take 600 mg by mouth as needed for Pain. lisinopril-hydroCHLOROthiazide 20-25 mg per tablet Commonly known as:  PRINZIDE, ZESTORETIC  
TAKE 1 TABLET BY MOUTH DAILY predniSONE 20 mg tablet Commonly known as:  Betty Look Take 1 Tab by mouth daily (with breakfast). Prescriptions Sent to Pharmacy Refills  
 predniSONE (DELTASONE) 20 mg tablet 0 Sig: Take 1 Tab by mouth daily (with breakfast). Class: Normal  
 Pharmacy: [id number] [place] [address], [address] [facility] #: [phone] Route: Oral  
  
Patient Instructions Sore Throat: Care Instructions Your Care Instructions Infection by bacteria or a virus causes most sore throats. Cigarette smoke, dry air, air pollution, allergies, and yelling can also cause a sore throat. Sore throats can be painful and annoying. Fortunately, most sore throats go away on their own. If you have a bacterial infection, your doctor may prescribe antibiotics. Follow-up care is a key part of your treatment and safety. Be sure to make and go to all appointments, and call your doctor if you are having problems. It's also a good idea to know your test results and keep a list of the medicines you take. How can you care for yourself at home? · If your doctor prescribed antibiotics, take them as directed. Do not stop taking them just because you feel better. You need to take the full course of antibiotics. · Gargle with warm salt water once an hour to help reduce swelling and relieve discomfort. Use 1 teaspoon of salt mixed in 1 cup of warm water. · Take an over-the-counter pain medicine, such as acetaminophen (Tylenol), ibuprofen (Advil, Motrin), or naproxen (Aleve). Read and follow all instructions on the label. · Be careful when taking over-the-counter cold or flu medicines and Tylenol at the same time. Many of these medicines have acetaminophen, which is Tylenol. Read the labels to make sure that you are not taking more than the recommended dose. Too much acetaminophen (Tylenol) can be harmful. · Drink plenty of fluids. Fluids may help soothe an irritated throat. Hot fluids, such as tea or soup, may help decrease throat pain. · Use over-the-counter throat lozenges to soothe pain. Regular cough drops or hard candy may also help. These should not be given to young children because of the risk of choking. · Do not smoke or allow others to smoke around you. If you need help quitting, talk to your doctor about stop-smoking programs and medicines. These can increase your chances of quitting for good. · Use a vaporizer or humidifier to add moisture to your bedroom. Follow the directions for cleaning the machine. When should you call for help? Call your doctor now or seek immediate medical care if: 
· You have new or worse trouble swallowing. · Your sore throat gets much worse on one side. Watch closely for changes in your health, and be sure to contact your doctor if you do not get better as expected. Where can you learn more? Go to http://tristan-marina.info/. Enter [id number] [id number] in the search box to learn more about \"Sore Throat: Care Instructions. \" Current as of: July 29, 2016 Content Version: 11.3 © 3789-5935 [employer]. Care instructions adapted under license by [employer] (which disclaims liability or warranty for this information). If you have questions about a medical condition or this instruction, always ask your healthcare professional. [address] any warranty or liability for your use of this information. Laryngitis: Care Instructions Your Care Instructions Laryngitis is an inflammation of the voice box (larynx) that causes your voice to become raspy or hoarse. It can be short-lived or long-lasting. Most of the time, laryngitis comes on quickly and lasts as long as 2 weeks. It is caused by overuse, irritation, or infection of the vocal cords inside the larynx. Some of the most common causes are a cold, the flu, or allergies. Loud talking, shouting, cheering, or singing also can cause laryngitis. Stomach acid that backs up into the throat also can make you lose your voice. Resting your voice and taking other steps at home can help you get your voice back. Follow-up care is a key part of your treatment and safety. Be sure to make and go to all appointments, and call your doctor if you are having problems. It's also a good idea to know your test results and keep a list of the medicines you take. How can you care for yourself at home? · Follow your doctor's directions for treating the condition that caused you to lose your voice. If your doctor prescribed antibiotics, take them as directed. Do not stop taking them just because you feel better. You need to take the full course of antibiotics. · Before you use cough and cold medicines, check the label. They may not be safe for young children or for people with certain health problems. · Try to keep stomach acid from backing up into your throat. Do not eat just before bedtime.  Reduce the amount of coffee and alcohol you drink, and eat healthy foods. Taking over-the-counter acid reducers can help when these steps are not enough. In some cases, you may need prescription medicine. · Rest your voice. You do not have to stop speaking, but use your voice as little as possible. Speak softly but do not whisper; whispering can bother your larynx more than speaking softly. Avoid talking on the telephone or trying to speak loudly. · Try not to clear your throat. This can cause more irritation of your larynx. Take an over-the-counter cough suppressant (if your doctor recommends it) if you have a dry cough that does not produce mucus. · Do not smoke or allow others to smoke around you. If you need help quitting, talk to your doctor about stop-smoking programs and medicines. These can increase your chances of quitting for good. · Use a humidifier or vaporizer to add moisture to your bedroom. Humidity helps to thin the mucus in the nasal membranes that causes stuffiness or postnasal drip. Follow the directions for cleaning the machine. · Drink plenty of fluids, enough so that your urine is light yellow or clear like water. If you have kidney, heart, or liver disease and have to limit fluids, talk with your doctor before you increase the amount of fluids you drink. · Use saline (saltwater) nasal washes to help keep your nasal passages open and wash out mucus and bacteria. You can buy saline nose drops at a grocery store or drugstore. Or, you can make your own at home by mixing ½ teaspoon salt, 1 cup water (at room temperature), and ½ teaspoon baking soda. If you make your own, fill a bulb syringe with the solution, insert the tip into your nostril, and squeeze gently. Yvonna Sins your nose. When should you call for help? Call 911 anytime you think you may need emergency care. For example, call if: 
· You have trouble breathing. Call your doctor now or seek immediate medical care if: 
· You have new or worse pain. · You have trouble swallowing. Watch closely for changes in your health, and be sure to contact your doctor if: 
· You do not get better as expected. Where can you learn more? Go to http://tristan-marina.info/. Enter A726 in the search box to learn more about \"Laryngitis: Care Instructions. \" Current as of: July 29, 2016 Content Version: 11.3 © 9710-0581 [employer]. Care instructions adapted under license by [employer] (which disclaims liability or warranty for this information). If you have questions about a medical condition or this instruction, always ask your healthcare professional. [address] any warranty or liability for your use of this information. Introducing [facility]! Dear Israel Barker: Thank you for requesting a [employer] account. Our records indicate that you already have an active [employer] account. You can access your account anytime at [email or web address] Did you know that you can access your hospital and ER discharge instructions at any time in [employer]? You can also review all of your test results from your hospital stay or ER visit. Additional Information If you have questions, please visit the Frequently Asked Questions section of the [employer] website at [email or web address] Remember, [employer] is NOT to be used for urgent needs. For medical emergencies, dial 911. Now available from your iPhone and Android! Please provide this summary of care documentation to your next provider. Your primary care clinician is listed as LAYA CALABRESE. If you have any questions after today's visit, please call [phone].

## 2017-07-17 NOTE — PATIENT INSTRUCTIONS
Sore Throat: Care Instructions  Your Care Instructions    Infection by bacteria or a virus causes most sore throats. Cigarette smoke, dry air, air pollution, allergies, and yelling can also cause a sore throat. Sore throats can be painful and annoying. Fortunately, most sore throats go away on their own. If you have a bacterial infection, your doctor may prescribe antibiotics. Follow-up care is a key part of your treatment and safety. Be sure to make and go to all appointments, and call your doctor if you are having problems. It's also a good idea to know your test results and keep a list of the medicines you take. How can you care for yourself at home? · If your doctor prescribed antibiotics, take them as directed. Do not stop taking them just because you feel better. You need to take the full course of antibiotics. · Gargle with warm salt water once an hour to help reduce swelling and relieve discomfort. Use 1 teaspoon of salt mixed in 1 cup of warm water. · Take an over-the-counter pain medicine, such as acetaminophen (Tylenol), ibuprofen (Advil, Motrin), or naproxen (Aleve). Read and follow all instructions on the label. · Be careful when taking over-the-counter cold or flu medicines and Tylenol at the same time. Many of these medicines have acetaminophen, which is Tylenol. Read the labels to make sure that you are not taking more than the recommended dose. Too much acetaminophen (Tylenol) can be harmful. · Drink plenty of fluids. Fluids may help soothe an irritated throat. Hot fluids, such as tea or soup, may help decrease throat pain. · Use over-the-counter throat lozenges to soothe pain. Regular cough drops or hard candy may also help. These should not be given to young children because of the risk of choking. · Do not smoke or allow others to smoke around you. If you need help quitting, talk to your doctor about stop-smoking programs and medicines.  These can increase your chances of quitting for good. · Use a vaporizer or humidifier to add moisture to your bedroom. Follow the directions for cleaning the machine. When should you call for help? Call your doctor now or seek immediate medical care if:  · You have new or worse trouble swallowing. · Your sore throat gets much worse on one side. Watch closely for changes in your health, and be sure to contact your doctor if you do not get better as expected. Where can you learn more? Go to http://tristan-marina.info/. Enter [id number] [id number] in the search box to learn more about \"Sore Throat: Care Instructions. \"  Current as of: July 29, 2016  Content Version: 11.3  © 5076-8823 [employer]Valve technologies. Care instructions adapted under license by [employer] (which disclaims liability or warranty for this information). If you have questions about a medical condition or this instruction, always ask your healthcare professional. [place] [address] any warranty or liability for your use of this information. Laryngitis: Care Instructions  Your Care Instructions    Laryngitis is an inflammation of the voice box (larynx) that causes your voice to become raspy or hoarse. It can be short-lived or long-lasting. Most of the time, laryngitis comes on quickly and lasts as long as 2 weeks. It is caused by overuse, irritation, or infection of the vocal cords inside the larynx. Some of the most common causes are a cold, the flu, or allergies. Loud talking, shouting, cheering, or singing also can cause laryngitis. Stomach acid that backs up into the throat also can make you lose your voice. Resting your voice and taking other steps at home can help you get your voice back. Follow-up care is a key part of your treatment and safety. Be sure to make and go to all appointments, and call your doctor if you are having problems. It's also a good idea to know your test results and keep a list of the medicines you take.   How can you care for yourself at home? · Follow your doctor's directions for treating the condition that caused you to lose your voice. If your doctor prescribed antibiotics, take them as directed. Do not stop taking them just because you feel better. You need to take the full course of antibiotics. · Before you use cough and cold medicines, check the label. They may not be safe for young children or for people with certain health problems. · Try to keep stomach acid from backing up into your throat. Do not eat just before bedtime. Reduce the amount of coffee and alcohol you drink, and eat healthy foods. Taking over-the-counter acid reducers can help when these steps are not enough. In some cases, you may need prescription medicine. · Rest your voice. You do not have to stop speaking, but use your voice as little as possible. Speak softly but do not whisper; whispering can bother your larynx more than speaking softly. Avoid talking on the telephone or trying to speak loudly. · Try not to clear your throat. This can cause more irritation of your larynx. Take an over-the-counter cough suppressant (if your doctor recommends it) if you have a dry cough that does not produce mucus. · Do not smoke or allow others to smoke around you. If you need help quitting, talk to your doctor about stop-smoking programs and medicines. These can increase your chances of quitting for good. · Use a humidifier or vaporizer to add moisture to your bedroom. Humidity helps to thin the mucus in the nasal membranes that causes stuffiness or postnasal drip. Follow the directions for cleaning the machine. · Drink plenty of fluids, enough so that your urine is light yellow or clear like water. If you have kidney, heart, or liver disease and have to limit fluids, talk with your doctor before you increase the amount of fluids you drink. · Use saline (saltwater) nasal washes to help keep your nasal passages open and wash out mucus and bacteria.  You can buy saline nose drops at a grocery store or drugstore. Or, you can make your own at home by mixing ½ teaspoon salt, 1 cup water (at room temperature), and ½ teaspoon baking soda. If you make your own, fill a bulb syringe with the solution, insert the tip into your nostril, and squeeze gently. Macel Halt your nose. When should you call for help? Call 911 anytime you think you may need emergency care. For example, call if:  · You have trouble breathing. Call your doctor now or seek immediate medical care if:  · You have new or worse pain. · You have trouble swallowing. Watch closely for changes in your health, and be sure to contact your doctor if:  · You do not get better as expected. Where can you learn more? Go to http://tristan-marina.info/. Enter M294 in the search box to learn more about \"Laryngitis: Care Instructions. \"  Current as of: July 29, 2016  Content Version: 11.3  © 6874-8670 [employer]. Care instructions adapted under license by [employer] (which disclaims liability or warranty for this information). If you have questions about a medical condition or this instruction, always ask your healthcare professional. [address] any warranty or liability for your use of this information.

## 2017-09-14 ENCOUNTER — TELEPHONE (OUTPATIENT)
Dept: INTERNAL MEDICINE CLINIC | Age: 62
End: 2017-09-14

## 2017-09-14 NOTE — TELEPHONE ENCOUNTER
Pt wife Bryanna Zurita calling to see if Doc reviewed pt's record that was supposed to be sent over from oncology doc and specialists. Ms. Toan Ray stated that pt was dx with Stage 4 cancer and the docs having to do more test to find out what type of chemo the patient need to have. Please notify Ms. Leyva if any records was received on patient new dx.

## 2017-09-15 ENCOUNTER — OFFICE VISIT (OUTPATIENT)
Dept: INTERNAL MEDICINE CLINIC | Age: 62
End: 2017-09-15

## 2017-09-15 VITALS
HEART RATE: 78 BPM | WEIGHT: 231 LBS | OXYGEN SATURATION: 98 % | DIASTOLIC BLOOD PRESSURE: 77 MMHG | HEIGHT: 70 IN | RESPIRATION RATE: 18 BRPM | BODY MASS INDEX: 33.07 KG/M2 | TEMPERATURE: 98.8 F | SYSTOLIC BLOOD PRESSURE: 138 MMHG

## 2017-09-15 DIAGNOSIS — I10 ESSENTIAL HYPERTENSION, BENIGN: Primary | ICD-10-CM

## 2017-09-15 DIAGNOSIS — E78.00 PURE HYPERCHOLESTEROLEMIA: ICD-10-CM

## 2017-09-15 DIAGNOSIS — Z23 ENCOUNTER FOR IMMUNIZATION: ICD-10-CM

## 2017-09-15 NOTE — PROGRESS NOTES
Patient is in the office today for a 4 month follow up. Patient states he is going to be starting chemo treatments, and will have a port placed. 1. Have you been to the ER, urgent care clinic since your last visit? Hospitalized since your last visit? No    2. Have you seen or consulted any other health care providers outside of the [address] [place] [address] since your last visit? Include any pap smears or colon screening. Yes, Dr. Delia Nyhan Surgeon, Dr. Sarah Iqbal ENT. Verbal order read back per Dr. Juli Crocker flu vaccine. Patient received flu vaccine in right deltoid. Patient tolerated well and left without complaints. Patient received flu VIS.

## 2017-09-15 NOTE — TELEPHONE ENCOUNTER
Left message for Ms. Leyva per Dr. Doreen Berumen specialist have been sending notes once patient has been seen. Any questions call the office.

## 2017-09-15 NOTE — MR AVS SNAPSHOT
Visit Information Date & Time Provider Department Dept. Phone Encounter #  
 9/15/2017  4:45 PM Carl Rolon MD [facility] 8 [id number] Follow-up Instructions Return in about 6 months (around 3/15/2018) for labs 1 week before. Your Appointments 1/8/2018  1:20 PM  
Follow Up with Harsha Saenz DO [facility] [place] [facility] ([facility]) Appt Note: 8 mth f/up [place] [address] [address] [address] [phone] [address] Upcoming Health Maintenance Date Due INFLUENZA AGE 9 TO ADULT 8/1/2017 Pneumococcal 19-64 Highest Risk (1 of 3 - PCV13) 9/22/2017* COLONOSCOPY 12/8/2024 DTaP/Tdap/Td series (2 - Td) 12/19/2024 *Topic was postponed. The date shown is not the original due date. Allergies as of 9/15/2017  Review Complete On: 9/15/2017 By: Carl Rolon MD  
  
 Severity Noted Reaction Type Reactions Tetracycline  07/02/2010    Itching Current Immunizations  Reviewed on 6/1/2016 Name Date Influenza Vaccine 12/19/2014 Influenza Vaccine (Quad) PF 1/13/2017  2:52 PM, 12/4/2015 Influenza Vaccine PF 11/1/2013 Tdap 12/19/2014 Zoster Vaccine, Live 5/31/2016 Not reviewed this visit You Were Diagnosed With   
  
 Codes Comments Essential hypertension, benign    -  Primary ICD-10-CM: I10 
ICD-9-CM: 401.1 Pure hypercholesterolemia     ICD-10-CM: E78.00 ICD-9-CM: 272.0 Vitals BP Pulse Temp Resp Height(growth percentile) Weight(growth percentile) 138/77 (BP 1 Location: Left arm, BP Patient Position: Sitting) 78 98.8 °F (37.1 °C) (Oral) 18 5' 10\" (1.778 m) 231 lb (104.8 kg) SpO2 BMI Smoking Status 98% 33.15 kg/m2 Former Smoker Vitals History BMI and BSA Data Body Mass Index Body Surface Area  
 33.15 kg/m 2 2.28 m 2 Preferred Pharmacy Pharmacy Name Phone [address], [address] [facility] [phone] Your Updated Medication List  
  
   
This list is accurate as of: 9/15/17  5:19 PM.  Always use your most recent med list.  
  
  
  
  
 atorvastatin 80 mg tablet Commonly known as:  LIPITOR  
TAKE 1 TABLET BY MOUTH DAILY  
  
 lisinopril-hydroCHLOROthiazide 20-25 mg per tablet Commonly known as:  PRINZIDE, ZESTORETIC  
TAKE 1 TABLET BY MOUTH DAILY  
  
 oxyCODONE IR 5 mg immediate release tablet Commonly known as:  Jewel Morro Take 1-2 Tabs by mouth every four (4) hours as needed for Pain. Max Daily Amount: 60 mg. Follow-up Instructions Return in about 6 months (around 3/15/2018) for labs 1 week before. To-Do List   
 09/18/2017 2:30 PM  
  Appointment with [facility] 1 at [address] [facility] ([phone])  
  
 11/03/2017  7:00 AM  
  Appointment with [address] [place] [address] 1 at [address] [facility] ([phone]) GENERAL INSTRUCTIONS - If you have a hand-written prescription for this exam, you must bring it with you on the day of your exam. - Only patients will be allowed in the exam room. This includes children. - Children under the age of 15 may not be left unattended. - [address] [place] [address] patients must have an armband and be accompanied by a caregiver or family member. - Unk Prior may be responsible for any co-payments and deductibles as indicated by your insurance carrier. APPOINTMENT CANCELLATION - To reschedule or cancel an appointment, please contact the Scheduling Department at [phone] Introducing [facility]! Dear Raffaele Silvestre: Thank you for requesting a [employer] account. Our records indicate that you already have an active [employer] account. You can access your account anytime at [email or web address] Did you know that you can access your hospital and ER discharge instructions at any time in TriActive?   You can also review all of your test results from your hospital stay or ER visit. Additional Information If you have questions, please visit the Frequently Asked Questions section of the [employer] website at [email or web address] Remember, [employer] is NOT to be used for urgent needs. For medical emergencies, dial 911. Now available from your iPhone and Android! Please provide this summary of care documentation to your next provider. Your primary care clinician is listed as LAYA CALABRESE. If you have any questions after today's visit, please call [phone].

## 2017-09-15 NOTE — PROGRESS NOTES
Subjective:       Chief Complaint  The patient presents for follow up of hypertension and high cholesterol. Obesity, CAD         HPI  Tao Solis is a 59 y.o. male seen for follow up of hyperlipidemia. Healso has hypertension. Hypertension well controlled, no significant medication side effects noted, on Zestoretic, hyperlipidemia better controlled with pt being more consistent with taking medications, no significant medication side effects noted, on Lipitor 80 mg but with pt having CAC score 331 he needs to get LDL<70. Pt has stopped tobacco use. Diet and Lifestyle: not attempting to follow a low fat, low cholesterol diet, exercises sporadically    Home BP Monitoring: is not measured at home. Other symptoms and concerns: pt has been recently diagnosed with stage 4 esophageal cancer. He is working with his oncologist to start chemotherapy. Discussed the patient's BMI with him. The BMI follow up plan is as follows: BMI is out of normal parameters and plan is as follows: I have counseled this patient on diet and exercise regimens. Current Outpatient Prescriptions   Medication Sig    oxyCODONE IR (ROXICODONE) 5 mg immediate release tablet Take 1-2 Tabs by mouth every four (4) hours as needed for Pain. Max Daily Amount: 60 mg.    lisinopril-hydroCHLOROthiazide (PRINZIDE, ZESTORETIC) 20-25 mg per tablet TAKE 1 TABLET BY MOUTH DAILY    atorvastatin (LIPITOR) 80 mg tablet TAKE 1 TABLET BY MOUTH DAILY    docusate sodium (COLACE) 100 mg capsule Take 100 mg by mouth daily. No current facility-administered medications for this visit.               Review of Systems  Respiratory: negative for dyspnea on exertion  Cardiovascular: negative for chest pain    Objective:     Visit Vitals    /77 (BP 1 Location: Left arm, BP Patient Position: Sitting)    Pulse 78    Temp 98.8 °F (37.1 °C) (Oral)    Resp 18    Ht 5' 10\" (1.778 m)    Wt 231 lb (104.8 kg)    SpO2 98%    BMI 33.15 kg/m2 General appearance - alert, well appearing, and in no distress  Neck - supple, no significant adenopathy, carotids upstroke normal bilaterally, no bruits  Chest - clear to auscultation, no wheezes, rales or rhonchi, symmetric air entry  Heart - normal rate, regular rhythm, normal S1, S2, no murmurs, rubs, clicks or gallops  Extremities - peripheral pulses normal, no pedal edema, no clubbing or cyanosis  Skin - normal coloration and turgor, no rashes, no suspicious skin lesions noted      Labs:   Lab Results   Component Value Date/Time    Cholesterol, total 134 09/01/2017 08:35 AM    HDL Cholesterol 44 09/01/2017 08:35 AM    LDL, calculated 75 09/01/2017 08:35 AM    Triglyceride 72 09/01/2017 08:35 AM    CHOL/HDL Ratio 3.1 12/04/2015 09:42 AM     Lab Results   Component Value Date/Time    ALT (SGPT) 27 09/01/2017 08:35 AM    AST (SGOT) 23 09/01/2017 08:35 AM    Alk. phosphatase 58 09/01/2017 08:35 AM    Bilirubin, total 0.4 09/01/2017 08:35 AM     Lab Results   Component Value Date/Time    GFR est AA >60 08/21/2017 11:20 AM    GFR est non-AA >60 08/21/2017 11:20 AM    Creatinine 1.0 09/07/2017 09:52 AM    BUN 17 09/01/2017 08:35 AM    Sodium 147 09/01/2017 08:35 AM    Potassium 3.5 09/01/2017 08:35 AM    Chloride 105 09/01/2017 08:35 AM    CO2 25 09/01/2017 08:35 AM            Assessment / Plan     Hypertension well controlled, on current meds  Hyperlipidemia well controlled, on Lipitor 80 mg daily. ICD-10-CM ICD-9-CM    1. Essential hypertension, benign B71 080.1 METABOLIC PANEL, COMPREHENSIVE   2. Pure hypercholesterolemia E78.00 272.0 LIPID PANEL   3. Encounter for immunization Z23 V03.89 INFLUENZA VIRUS VAC QUAD,SPLIT,PRESV FREE SYRINGE IM               Low cholesterol diet, weight control and daily exercise discussed. Follow-up Disposition:  Return in about 6 months (around 3/15/2018) for labs 1 week before. Reviewed plan of care. Patient has provided input and agrees with goals.

## 2017-10-27 ENCOUNTER — TELEPHONE (OUTPATIENT)
Dept: FAMILY MEDICINE CLINIC | Age: 62
End: 2017-10-27

## 2018-01-01 ENCOUNTER — CLINICAL SUPPORT (OUTPATIENT)
Dept: FAMILY MEDICINE CLINIC | Age: 63
End: 2018-01-01

## 2018-01-01 ENCOUNTER — HOSPITAL ENCOUNTER (OUTPATIENT)
Dept: NON INVASIVE DIAGNOSTICS | Age: 63
Discharge: HOME | End: 2018-10-31
Attending: INTERNAL MEDICINE
Payer: COMMERCIAL

## 2018-01-01 ENCOUNTER — OFFICE VISIT (OUTPATIENT)
Dept: FAMILY MEDICINE CLINIC | Age: 63
End: 2018-01-01

## 2018-01-01 ENCOUNTER — OFFICE VISIT (OUTPATIENT)
Dept: CARDIOLOGY CLINIC | Age: 63
End: 2018-01-01

## 2018-01-01 ENCOUNTER — OFFICE VISIT (OUTPATIENT)
Dept: ORTHOPEDIC SURGERY | Age: 63
End: 2018-01-01

## 2018-01-01 VITALS
SYSTOLIC BLOOD PRESSURE: 116 MMHG | BODY MASS INDEX: 34.02 KG/M2 | WEIGHT: 243 LBS | HEART RATE: 72 BPM | DIASTOLIC BLOOD PRESSURE: 71 MMHG | OXYGEN SATURATION: 95 % | TEMPERATURE: 97.7 F | RESPIRATION RATE: 20 BRPM | HEIGHT: 71 IN

## 2018-01-01 VITALS
HEIGHT: 71 IN | OXYGEN SATURATION: 97 % | SYSTOLIC BLOOD PRESSURE: 122 MMHG | BODY MASS INDEX: 34.16 KG/M2 | DIASTOLIC BLOOD PRESSURE: 74 MMHG | HEART RATE: 63 BPM | WEIGHT: 244 LBS

## 2018-01-01 VITALS
HEART RATE: 105 BPM | HEIGHT: 71 IN | RESPIRATION RATE: 20 BRPM | SYSTOLIC BLOOD PRESSURE: 141 MMHG | TEMPERATURE: 100.6 F | DIASTOLIC BLOOD PRESSURE: 72 MMHG | OXYGEN SATURATION: 94 % | WEIGHT: 244 LBS | BODY MASS INDEX: 34.16 KG/M2

## 2018-01-01 VITALS
HEART RATE: 69 BPM | DIASTOLIC BLOOD PRESSURE: 79 MMHG | HEIGHT: 71 IN | SYSTOLIC BLOOD PRESSURE: 143 MMHG | TEMPERATURE: 97.8 F | WEIGHT: 242 LBS | BODY MASS INDEX: 33.88 KG/M2 | RESPIRATION RATE: 16 BRPM | OXYGEN SATURATION: 98 %

## 2018-01-01 VITALS
WEIGHT: 244 LBS | HEIGHT: 71 IN | BODY MASS INDEX: 34.16 KG/M2 | SYSTOLIC BLOOD PRESSURE: 122 MMHG | DIASTOLIC BLOOD PRESSURE: 74 MMHG

## 2018-01-01 DIAGNOSIS — Z12.5 SCREENING PSA (PROSTATE SPECIFIC ANTIGEN): ICD-10-CM

## 2018-01-01 DIAGNOSIS — I10 ESSENTIAL HYPERTENSION, BENIGN: ICD-10-CM

## 2018-01-01 DIAGNOSIS — J02.9 PHARYNGITIS, UNSPECIFIED ETIOLOGY: Primary | ICD-10-CM

## 2018-01-01 DIAGNOSIS — M75.52 SUBACROMIAL BURSITIS OF LEFT SHOULDER JOINT: Primary | ICD-10-CM

## 2018-01-01 DIAGNOSIS — J02.9 SORE THROAT: ICD-10-CM

## 2018-01-01 DIAGNOSIS — Z09 CHEMOTHERAPY FOLLOW-UP EXAMINATION: Primary | ICD-10-CM

## 2018-01-01 DIAGNOSIS — R93.1 AGATSTON CORONARY ARTERY CALCIUM SCORE BETWEEN 200 AND 399: ICD-10-CM

## 2018-01-01 DIAGNOSIS — C15.9 ADENOCARCINOMA OF ESOPHAGUS, STAGE 4 (HCC): ICD-10-CM

## 2018-01-01 DIAGNOSIS — G47.30 SLEEP APNEA, UNSPECIFIED TYPE: ICD-10-CM

## 2018-01-01 DIAGNOSIS — R05.9 COUGH: ICD-10-CM

## 2018-01-01 DIAGNOSIS — Z23 ENCOUNTER FOR IMMUNIZATION: Primary | ICD-10-CM

## 2018-01-01 DIAGNOSIS — E78.00 PURE HYPERCHOLESTEROLEMIA: ICD-10-CM

## 2018-01-01 DIAGNOSIS — C79.9 METASTATIC ADENOCARCINOMA (HCC): ICD-10-CM

## 2018-01-01 DIAGNOSIS — M19.012 OSTEOARTHRITIS OF LEFT AC (ACROMIOCLAVICULAR) JOINT: ICD-10-CM

## 2018-01-01 DIAGNOSIS — E78.00 PURE HYPERCHOLESTEROLEMIA: Primary | ICD-10-CM

## 2018-01-01 DIAGNOSIS — Z09 CHEMOTHERAPY FOLLOW-UP EXAMINATION: ICD-10-CM

## 2018-01-01 LAB
A-G RATIO,AGRAT: 1.6 RATIO (ref 1.1–2.6)
ALBUMIN SERPL-MCNC: 4.1 G/DL (ref 3.5–5)
ALP SERPL-CCNC: 63 U/L (ref 40–125)
ALT SERPL-CCNC: 26 U/L (ref 5–40)
ANION GAP SERPL CALC-SCNC: 17 MMOL/L
AST SERPL W P-5'-P-CCNC: 27 U/L (ref 10–37)
BILIRUB SERPL-MCNC: 0.4 MG/DL (ref 0.2–1.2)
BUN SERPL-MCNC: 15 MG/DL (ref 6–22)
CALCIUM SERPL-MCNC: 9 MG/DL (ref 8.4–10.4)
CHLORIDE SERPL-SCNC: 102 MMOL/L (ref 98–110)
CHOLEST SERPL-MCNC: 158 MG/DL (ref 110–200)
CO2 SERPL-SCNC: 24 MMOL/L (ref 20–32)
CREAT SERPL-MCNC: 1 MG/DL (ref 0.8–1.6)
ECHO AO ROOT DIAM: 4.35 CM
ECHO LA AREA 4C: 20.1 CM2
ECHO LA VOL 2C: 76.11 ML (ref 18–58)
ECHO LA VOL 4C: 48.97 ML (ref 18–58)
ECHO LA VOL BP: 73.4 ML (ref 18–58)
ECHO LA VOL/BSA BIPLANE: 31.98 ML/M2 (ref 16–28)
ECHO LA VOLUME INDEX A2C: 33.17 ML/M2 (ref 16–28)
ECHO LA VOLUME INDEX A4C: 21.34 ML/M2 (ref 16–28)
ECHO LV E' LATERAL VELOCITY: 10.34 CM/S
ECHO LV E' SEPTAL VELOCITY: 4.12 CM/S
ECHO LV INTERNAL DIMENSION DIASTOLIC: 3.53 CM (ref 4.2–5.9)
ECHO LV INTERNAL DIMENSION SYSTOLIC: 2.12 CM
ECHO LV IVSD: 1.7 CM (ref 0.6–1)
ECHO LV MASS 2D: 315.2 G (ref 88–224)
ECHO LV MASS INDEX 2D: 137.4 G/M2 (ref 49–115)
ECHO LV POSTERIOR WALL DIASTOLIC: 1.89 CM (ref 0.6–1)
ECHO LVOT DIAM: 2.5 CM
ECHO MV A VELOCITY: 72.33 CM/S
ECHO MV E DECELERATION TIME (DT): 278 MS
ECHO MV E VELOCITY: 0.49 CM/S
ECHO MV E/A RATIO: 0.01
ECHO MV E/E' LATERAL: 0.05
ECHO MV E/E' RATIO (AVERAGED): 0.08
ECHO MV E/E' SEPTAL: 0.12
ECHO PVEIN A DURATION: 129.8 MS
ECHO PVEIN A VELOCITY: 21.28 CM/S
ECHO RV INTERNAL DIMENSION: 4.21 CM
GFRAA, 66117: >60
GFRNA, 66118: >60
GLOBULIN,GLOB: 2.6 G/DL (ref 2–4)
GLUCOSE SERPL-MCNC: 108 MG/DL (ref 70–99)
HDLC SERPL-MCNC: 3.8 MG/DL (ref 0–5)
HDLC SERPL-MCNC: 42 MG/DL (ref 40–59)
LDLC SERPL CALC-MCNC: 101 MG/DL (ref 50–99)
POTASSIUM SERPL-SCNC: 4 MMOL/L (ref 3.5–5.5)
PROT SERPL-MCNC: 6.7 G/DL (ref 6.2–8.1)
QUICKVUE INFLUENZA TEST: NEGATIVE
S PYO AG THROAT QL: NEGATIVE
SODIUM SERPL-SCNC: 143 MMOL/L (ref 133–145)
TRIGL SERPL-MCNC: 76 MG/DL (ref 40–149)
VALID INTERNAL CONTROL?: YES
VALID INTERNAL CONTROL?: YES
VLDLC SERPL CALC-MCNC: 15 MG/DL (ref 8–30)

## 2018-01-01 PROCEDURE — 93306 TTE W/DOPPLER COMPLETE: CPT

## 2018-01-01 RX ORDER — MONTELUKAST SODIUM 10 MG/1
10 TABLET ORAL DAILY
Qty: 30 TAB | Refills: 5 | Status: SHIPPED | OUTPATIENT
Start: 2018-01-01

## 2018-01-01 RX ORDER — CHLORHEXIDINE GLUCONATE 1.2 MG/ML
RINSE ORAL
Refills: 5 | COMMUNITY
Start: 2018-06-14

## 2018-01-01 RX ORDER — TRIAMCINOLONE ACETONIDE 40 MG/ML
40 INJECTION, SUSPENSION INTRA-ARTICULAR; INTRAMUSCULAR ONCE
Qty: 1 ML | Refills: 0
Start: 2018-01-01 | End: 2018-01-01

## 2018-01-01 RX ORDER — AZITHROMYCIN 250 MG/1
TABLET, FILM COATED ORAL
Qty: 6 TAB | Refills: 0 | Status: SHIPPED | OUTPATIENT
Start: 2018-01-01 | End: 2018-01-01

## 2018-01-01 RX ORDER — CODEINE PHOSPHATE AND GUAIFENESIN 10; 100 MG/5ML; MG/5ML
10 SOLUTION ORAL
Qty: 120 ML | Refills: 0 | Status: ON HOLD | OUTPATIENT
Start: 2018-01-01 | End: 2019-01-01

## 2018-01-08 ENCOUNTER — OFFICE VISIT (OUTPATIENT)
Dept: CARDIOLOGY CLINIC | Age: 63
End: 2018-01-08

## 2018-01-08 VITALS — BODY MASS INDEX: 33.88 KG/M2 | OXYGEN SATURATION: 97 % | WEIGHT: 242 LBS | HEART RATE: 70 BPM | HEIGHT: 71 IN

## 2018-01-08 DIAGNOSIS — E78.00 PURE HYPERCHOLESTEROLEMIA: Primary | ICD-10-CM

## 2018-01-08 DIAGNOSIS — R93.1 AGATSTON CORONARY ARTERY CALCIUM SCORE BETWEEN 200 AND 399: ICD-10-CM

## 2018-01-08 DIAGNOSIS — G47.30 SLEEP APNEA, UNSPECIFIED TYPE: ICD-10-CM

## 2018-01-08 DIAGNOSIS — I10 ESSENTIAL HYPERTENSION, BENIGN: ICD-10-CM

## 2018-01-08 DIAGNOSIS — C79.9 METASTATIC ADENOCARCINOMA (HCC): ICD-10-CM

## 2018-01-08 NOTE — MR AVS SNAPSHOT
Visit Information Date & Time Provider Department Dept. Phone Encounter #  
 1/8/2018  1:20 PM Franko Crum,  [facility] [address] [id number] Your Appointments 3/9/2018  8:30 AM  
LAB with [facility] ([facility]) Appt Note: 6 mos lab [address] [address] [place] [address]  
[phone]  
  
   
 [address],  [address] Upcoming Health Maintenance Date Due Pneumococcal 19-64 Highest Risk (1 of 3 - PCV13) 11/26/1974 COLONOSCOPY 12/8/2024 DTaP/Tdap/Td series (2 - Td) 12/19/2024 Allergies as of 1/8/2018  Review Complete On: 9/20/2017 By: Myron Sosa RN Severity Noted Reaction Type Reactions Tetracycline  07/02/2010    Itching Current Immunizations  Reviewed on 9/15/2017 Name Date Influenza Vaccine 12/19/2014 Influenza Vaccine (Quad) PF 9/15/2017, 1/13/2017  2:52 PM, 12/4/2015 Influenza Vaccine PF 11/1/2013 Tdap 12/19/2014 Zoster Vaccine, Live 5/31/2016 Not reviewed this visit You Were Diagnosed With   
  
 Codes Comments Pure hypercholesterolemia    -  Primary ICD-10-CM: E78.00 ICD-9-CM: 272.0 Agatston coronary artery calcium score between 200 and 399     ICD-10-CM: R93.1 ICD-9-CM: 793.2 Essential hypertension, benign     ICD-10-CM: I10 
ICD-9-CM: 401.1 Sleep apnea, unspecified type     ICD-10-CM: G47.30 ICD-9-CM: 780.57 Vitals Pulse Height(growth percentile) Weight(growth percentile) SpO2 BMI Smoking Status 70 5' 11\" (1.803 m) 242 lb (109.8 kg) 97% 33.75 kg/m2 Former Smoker Vitals History BMI and BSA Data Body Mass Index Body Surface Area 33.75 kg/m 2 2.35 m 2 Preferred Pharmacy Pharmacy Name Phone [address], [address] [place] [address] [phone] Your Updated Medication List  
  
   
 This list is accurate as of: 1/8/18  2:15 PM.  Always use your most recent med list.  
  
  
  
  
 atorvastatin 80 mg tablet Commonly known as:  LIPITOR  
TAKE 1 TABLET BY MOUTH DAILY COLACE 100 mg capsule Generic drug:  docusate sodium Take 100 mg by mouth daily. lisinopril-hydroCHLOROthiazide 20-25 mg per tablet Commonly known as:  PRINZIDE, ZESTORETIC  
TAKE 1 TABLET BY MOUTH DAILY  
  
 oxyCODONE IR 5 mg immediate release tablet Commonly known as:  Shaista [place] Take 1-2 Tabs by mouth every four (4) hours as needed for Pain. Max Daily Amount: 60 mg. We Performed the Following AMB POC EKG ROUTINE W/ 12 LEADS, INTER & REP [21226 CPT(R)] To-Do List   
 01/09/2018 2:00 PM  
  Appointment with [address] at [address] ([phone]) DIET RESTRICTIONS NPO, have nothing to eat or drink 4 hours prior to your exam.  EXAM INSTRUCTIONS - Please bring a list of all medications that you are currently taking, Include prescription and over the counter. - If you are allergic to IV/Contrast dye/IVP dye/iodine, an allergy prep is required. If your doctor did not give you a prescription for Benadryl or Prednisone, you may  a prescription from radiology between 8am-5pm.  Bring your written order for your exam when picking up your prep unless it has already been faxed by your physician. GENERAL INSTRUCTIONS - If you have a hand-written prescription for this exam, you must bring it with you on the day of your exam. - Bring all relevant prior images from facilities outside of [facility]. Failure to provide images may delay reading by Radiologist. - Only patients will be allowed in the exam room. This includes children. - Children under the age of 15 may not be left unattended. - [facility] patients must have an armband and be accompanied by a caregiver or family member.  - You may be responsible for any co-payments and deductibles as indicated by your insurance carrier. APPOINTMENT CANCELLATION - To reschedule or cancel an appointment, please contact the Scheduling Department at [phone]  
  
 01/09/2018 2:45 PM  
  Appointment with Isidro Sagastume Vamshi at [address] ([phone]) DIET RESTRICTIONS NPO, have nothing to eat or drink 4 hours prior to your exam.  EXAM INSTRUCTIONS - If your test requires you to drink oral contrast it may be picked up from radiology between 8am-5pm.  M-F Bring your written order for your exam when picking up your prep unless it has already been faxed by your physician. If you cannot make it between these times call the CT dept. [phone]. - If you are allergic to IV/Contrast dye/IVP dye/iodine, an  allergy prep is required. If your doctor did not give you a prescription for Benadryl or Prednisone, you may  a prescription from radiology between 8am-5pm.  Bring your written order for your exam when picking up your prep unless it has already been faxed by your physician. - Only patients will be allowed in the exam room. This includes children. - Children under the age of 15 may not be left unattended - Please bring a list of all medications that you are currently taking, include prescription and over the counter. GENERAL INSTRUCTIONS - If you have a hand-written prescription for this exam, you must bring it with you on the day of your exam. - Bring all relevant prior images from facilities outside of [facility]. Failure to provide images may   delay reading by Radiologist. - Only patients will be allowed in the exam room. This includes children. - Children under the age of 15 may not be left unattended.  - [address] [facility] patients must have an armband and be accompanied by a caregiver or family member. - Loulou Cheng may be responsible for any co-payments and deductibles as indicated by your insurance carrier. APPOINTMENT CANCELLATION - To reschedule or cancel an appointment, please contact the Scheduling Department at [phone] Introducing [facility] SERVICES! Dear Vinnie Tim: Thank you for requesting a [employer] account. Our records indicate that you already have an active [employer] account. You can access your account anytime at [email or web address] Did you know that you can access your hospital and ER discharge instructions at any time in [employer] You can also review all of your test results from your hospital stay or ER visit. Additional Information If you have questions, please visit the Frequently Asked Questions section of the [employer] website at [email or web address] Remember, [employer] is NOT to be used for urgent needs. For medical emergencies, dial 911. Now available from your iPhone and Android! Please provide this summary of care documentation to your next provider. Your primary care clinician is listed as LAYA CALABRESE. If you have any questions after today's visit, please call [phone].

## 2018-01-08 NOTE — PROGRESS NOTES
HPI:  I saw Italia Max in my office today in cardiovascular evaluation regarding an abnormal CT of the chest that he's had recently. Mr. Melba Zapata is a pleasant 57year old gentleman with history of hypertension, hyperlipidemia, and an abnormal coronary artery calcium score at 331 completed, along with some mild ascending aortic dilatation. He has historically been a smoker, but quit smoking within the last couple of months. He did have a CT scan on 12/7/2015 and again on 12/23/2015. The initial CT was for a calcium score which was 331, and the second CT was for further evaluation of some chest nodules. Both CTs demonstrated mildly dilated ascending aorta at 4.3 x 4.3 cm at largest dimension, suggesting a very early ascending aortic aneurysm. He comes in today and relates that he was diagnosed with metastatic adenocarcinoma to the lung and liver with apparently significant liver lymphadenopathy. The patient relates that this was diagnosed with a lung biopsy by Dr. Rose Luis and for which she is getting chemotherapy. He is had about a 25 pound weight loss when this started several months back but he is now regained most of his weight although is still down about 10 pounds from when I saw him last in May 2017. He relates that from a cardiovascular vantage she is doing reasonably well without any chest pain problems and his only real problem is shortness of breath which he thinks is related to his metastatic cancer. Encounter Diagnoses   Name Primary?  Agatston coronary artery calcium score between 200 and 399     Pure hypercholesterolemia Yes    Essential hypertension, benign     Sleep apnea, unspecified type     Metastatic adenocarcinoma ([facility] [address]        Discussion: This gentleman appears to be doing reasonably well from a cardiovascular vantage on his present medical regimen without any clear-cut signs of development of symptomatic obstructive coronary disease or heart failure.     He does have history of an ascending aortic aneurysm which was 4.3 cm back on July 2016 and we will have to check to see if this was evaluated on his most recent CT scans, but obviously with his metastatic stage IV adenocarcinoma this is of lesser concern. His latest lipid profile that have a copy of was completed on September 1, 2017 showed total cholesterol 134 with HDL 44, LDL of 75, VLDL of 14, and triglycerides of 72 which is reasonable control on Lipitor 80 mg daily. His blood pressure was borderline elevated today and I have asked him to try to check his blood pressure at home a couple times a week and if his blood pressure staying above 320 systolic I would recommend that he follow-up with Dr. Stefan Winston for further adjustment of his blood pressure medication moving forward. Since he is otherwise stable from a cardiovascular vantage I will simply plan to see him again in several months. PCP: Bryan Castro MD      Past Medical History:   Diagnosis Date    Agatston CAC score 200-399 12/03/2015    Coronary calcium score 331.  Essential hypertension, benign     History of tobacco use     quit 1/2016    Hoarseness of voice     Joint pain     Mediastinal mass     Pure hypercholesterolemia     Sleep apnea     CPAP    Tobacco dependency        Past Surgical History:   Procedure Laterality Date    HX OTHER SURGICAL Right 2011    Removal of shoulder bone spur     HX VASCULAR ACCESS      mediport left shoulder       Current Outpatient Rx   Name  Route  Sig  Dispense  Refill    lisinopril-hydrochlorothiazide (PRINZIDE, ZESTORETIC) 20-25 mg per tablet    Oral    Take 1 Tab by mouth daily. 90 Tab    3      atorvastatin (LIPITOR) 80 mg tablet    Oral    Take 1 Tab by mouth daily. 90 Tab    3      ibuprofen (MOTRIN) 600 mg tablet    Oral    Take 600 mg by mouth as needed for Pain.               fluticasone (FLONASE) 50 mcg/actuation nasal spray        1 spray to each nostril daily    1 Bottle    3         Allergies Allergen Reactions    Iodine And Iodide Containing Products Nausea and Vomiting    Tetracycline Itching       Social History :  Social History   Substance Use Topics    Smoking status: Former Smoker     Packs/day: 1.00     Years: 20.00     Types: Cigarettes     Quit date: 1/2/2016    Smokeless tobacco: Never Used    Alcohol use Yes      Comment: occassionally/RARELY        Family History: family history includes Diabetes in his brother and father; Heart Attack in his brother; Heart Failure in his father; Kidney Disease in his mother; Stroke in his brother. Review of Systems:    Constitutional: Negative for chills, fever, malaise/fatigue and weight loss. Respiratory: Positive for cough and shortness of breath. Negative for hemoptysis and wheezing. Cardiovascular: Negative for chest pain, palpitations, orthopnea and leg swelling. Gastrointestinal: Positive for constipation. Negative for abdominal pain, blood in stool, diarrhea, heartburn, melena, nausea and vomiting. Musculoskeletal: Negative for falls and joint pain. Neurological: Negative for dizziness. Physical Exam:    The patient is a cooperative, alert, well developed, well nourished 57 y.o. dark skinned  male who is in no acute distress at the time of the examination. Visit Vitals    Pulse 70    Ht 5' 11\" (1.803 m)    Wt 109.8 kg (242 lb)    SpO2 97%    BMI 33.75 kg/m2       HEENT: Conjuctiva white, mucosa moist, no pallor or cyanosis. NECK: Supple without masses, tenderness or thyromegaly. There was no jugular venous distention. Carotid are full bilaterally without bruits. CHEST: Symmetrical with good excursion. LUNGS: Clear to auscultation in all fields. HEART: The apex is not displaced. There were no lifts, thrills or heaves. There is a normal S1 and S2 without appreciable murmurs, rubs, clicks, or gallops auscultated. ABDOMEN: Soft without masses, tenderness or organomegaly.   EXTREMITIES: Full peripheral pulses without peripheral edema. Review of Data: See PMH and Cardiology and Imaging sections for cardiac testing  Lab Results   Component Value Date/Time    Cholesterol, total 134 09/01/2017 08:35 AM    HDL Cholesterol 44 09/01/2017 08:35 AM    LDL, calculated 75 09/01/2017 08:35 AM    Triglyceride 72 09/01/2017 08:35 AM    CHOL/HDL Ratio 3.1 12/04/2015 09:42 AM       Results for orders placed or performed in visit on 01/08/18   AMB POC EKG ROUTINE W/ 12 LEADS, INTER & REP     Status: None    Narrative    Normal sinus rhythm, rate 70. This EKG is within normal limits and similar to the EKG of August 21, 2017. Cynthia Billings D.O., F.A.C.C. [facility]  [facility] [place]  [place] [address]. [address] [address] [address]    PLEASE NOTE:  This document has been produced using voice recognition software. Unrecognized errors in transcription may be present.

## 2018-01-08 NOTE — PROGRESS NOTES
Review of Systems   Constitutional: Negative for chills, fever, malaise/fatigue and weight loss. Respiratory: Positive for cough and shortness of breath. Negative for hemoptysis and wheezing. Cardiovascular: Negative for chest pain, palpitations, orthopnea and leg swelling. Gastrointestinal: Positive for constipation. Negative for abdominal pain, blood in stool, diarrhea, heartburn, melena, nausea and vomiting. Musculoskeletal: Negative for falls and joint pain. Neurological: Negative for dizziness.

## 2018-01-08 NOTE — PROGRESS NOTES
1. Have you been to the ER, urgent care clinic since your last visit? Hospitalized since your last visit?no    2. Have you seen or consulted any other health care providers outside of the [address] [place] [address] since your last visit? Include any pap smears or colon screening.  no

## 2018-01-19 RX ORDER — ATORVASTATIN CALCIUM 80 MG/1
TABLET, FILM COATED ORAL
Qty: 90 TAB | Refills: 3 | Status: SHIPPED | OUTPATIENT
Start: 2018-01-19 | End: 2019-01-01 | Stop reason: SDUPTHER

## 2018-03-13 ENCOUNTER — OFFICE VISIT (OUTPATIENT)
Dept: FAMILY MEDICINE CLINIC | Age: 63
End: 2018-03-13

## 2018-03-13 VITALS
WEIGHT: 237.2 LBS | OXYGEN SATURATION: 95 % | TEMPERATURE: 98.8 F | HEART RATE: 91 BPM | DIASTOLIC BLOOD PRESSURE: 66 MMHG | HEIGHT: 71 IN | BODY MASS INDEX: 33.21 KG/M2 | SYSTOLIC BLOOD PRESSURE: 96 MMHG | RESPIRATION RATE: 18 BRPM

## 2018-03-13 DIAGNOSIS — J06.9 UPPER RESPIRATORY TRACT INFECTION, UNSPECIFIED TYPE: Primary | ICD-10-CM

## 2018-03-13 DIAGNOSIS — R09.81 NASAL CONGESTION: ICD-10-CM

## 2018-03-13 DIAGNOSIS — R05.9 COUGH: ICD-10-CM

## 2018-03-13 RX ORDER — PREDNISONE 10 MG/1
TABLET ORAL
Qty: 21 TAB | Refills: 0 | Status: SHIPPED | OUTPATIENT
Start: 2018-03-13 | End: 2018-06-01 | Stop reason: ALTCHOICE

## 2018-03-13 RX ORDER — PROCHLORPERAZINE MALEATE 10 MG
10 TABLET ORAL
Status: ON HOLD | COMMUNITY
Start: 2017-09-26 | End: 2019-01-01

## 2018-03-13 RX ORDER — ONDANSETRON HYDROCHLORIDE 8 MG/1
8 TABLET, FILM COATED ORAL
COMMUNITY
Start: 2017-09-26

## 2018-03-13 RX ORDER — CETIRIZINE HCL 10 MG
10 TABLET ORAL DAILY
Qty: 30 TAB | Refills: 0 | Status: SHIPPED | OUTPATIENT
Start: 2018-03-13

## 2018-03-13 RX ORDER — HYDROCODONE POLISTIREX AND CHLORPHENIRAMINE POLISTIREX 10; 8 MG/5ML; MG/5ML
1 SUSPENSION, EXTENDED RELEASE ORAL
Qty: 240 ML | Refills: 0 | Status: SHIPPED | OUTPATIENT
Start: 2018-03-13 | End: 2018-03-13 | Stop reason: SDUPTHER

## 2018-03-13 RX ORDER — HYDROCODONE POLISTIREX AND CHLORPHENIRAMINE POLISTIREX 10; 8 MG/5ML; MG/5ML
1 SUSPENSION, EXTENDED RELEASE ORAL
Qty: 240 ML | Refills: 0 | Status: SHIPPED | OUTPATIENT
Start: 2018-03-13 | End: 2018-01-01 | Stop reason: ALTCHOICE

## 2018-03-13 RX ORDER — CEFDINIR 300 MG/1
300 CAPSULE ORAL 2 TIMES DAILY
Qty: 20 CAP | Refills: 0 | Status: SHIPPED | OUTPATIENT
Start: 2018-03-13 | End: 2018-03-23

## 2018-03-13 NOTE — MR AVS SNAPSHOT
[address] [place] [address] 
 
 
 [address] [id number] [facility] [address] 
[phone] Patient: Latia Ghotra MRN:  [id number] Visit Information Date & Time Provider Department Dept. Phone Encounter #  
 3/13/2018  4:15 PM [facility] [phone] [id number] Your Appointments 9/10/2018 12:40 PM  
Follow Up with Chavo Ignacio DO [facility] ([facility]) Appt Note: 8 months follow up [place] [id number] [phone] [address] Upcoming Health Maintenance Date Due Pneumococcal 19-64 Highest Risk (1 of 3 - PCV13) 1972 COLONOSCOPY 2024 DTaP/Tdap/Td series (2 - Td) 2024 Allergies as of 3/13/2018  Review Complete On: 3/13/2018 By: Brown Poole Severity Noted Reaction Type Reactions Iodine And Iodide Containing Products High 2018    Nausea and Vomiting Tetracycline  2010    Itching Current Immunizations  Reviewed on 9/15/2017 Name Date Influenza Vaccine 2014 Influenza Vaccine (Quad) PF 9/15/2017, 2017  2:52 PM, 2015 Influenza Vaccine PF 2013 Tdap 2014 Zoster Vaccine, Live 2016 Not reviewed this visit You Were Diagnosed With   
  
 Codes Comments Upper respiratory tract infection, unspecified type    -  Primary ICD-10-CM: J06.9 ICD-9-CM: 465.9 Cough     ICD-10-CM: R05 ICD-9-CM: 786.2 Nasal congestion     ICD-10-CM: R09.81 ICD-9-CM: 478.19 Vitals BP Pulse Temp Resp Height(growth percentile) Weight(growth percentile) 96/66 (BP 1 Location: Left arm, BP Patient Position: Sitting) 91 98.8 °F (37.1 °C) (Oral) 18 5' 11\" (1.803 m) 237 lb 3.2 oz (107.6 kg) SpO2 BMI Smoking Status 95% 33.08 kg/m2 Former Smoker BMI and BSA Data Body Mass Index Body Surface Area 33.08 kg/m 2 2.32 m 2 Preferred Pharmacy Pharmacy Name Phone [address], [address] [facility] [phone] Your Updated Medication List  
  
   
This list is accurate as of 3/13/18  4:51 PM.  Always use your most recent med list.  
  
  
  
  
 atorvastatin 80 mg tablet Commonly known as:  LIPITOR  
TAKE 1 TABLET BY MOUTH DAILY  
  
 cefdinir 300 mg capsule Commonly known as:  OMNICEF Take 1 Cap by mouth two (2) times a day for 10 days. cetirizine 10 mg tablet Commonly known as:  ZYRTEC Take 1 Tab by mouth daily. chlorpheniramine-HYDROcodone 10-8 mg/5 mL suspension Commonly known as:  Kacey President Take 5 mL by mouth every twelve (12) hours as needed for Cough. Max Daily Amount: 10 mL. COLACE 100 mg capsule Generic drug:  docusate sodium Take 100 mg by mouth daily. COMPAZINE 10 mg tablet Generic drug:  prochlorperazine Take 10 mg by mouth.  
  
 lisinopril-hydroCHLOROthiazide 20-25 mg per tablet Commonly known as:  PRINZIDE, ZESTORETIC  
TAKE 1 TABLET BY MOUTH DAILY  
  
 ondansetron hcl 8 mg tablet Commonly known as:  Norma Dieter Take 8 mg by mouth. oxyCODONE IR 5 mg immediate release tablet Commonly known as:  Gio Mines Take 1-2 Tabs by mouth every four (4) hours as needed for Pain. Max Daily Amount: 60 mg.  
  
 predniSONE 10 mg dose pack Commonly known as:  STERAPRED DS See administration instruction per 10mg dose pack Prescriptions Printed Refills  
 chlorpheniramine-HYDROcodone (TUSSIONEX) 10-8 mg/5 mL suspension 0 Sig: Take 5 mL by mouth every twelve (12) hours as needed for Cough. Max Daily Amount: 10 mL. Class: Print Route: Oral  
  
Prescriptions Sent to Pharmacy Refills  
 predniSONE (STERAPRED DS) 10 mg dose pack 0 Sig: See administration instruction per 10mg dose pack  Class: Normal  
 Pharmacy: [id number], [address] [place] [address] Ph #: [phone] cefdinir (OMNICEF) 300 mg capsule 0 Sig: Take 1 Cap by mouth two (2) times a day for 10 days. Class: Normal  
 Pharmacy: [id number], [address] [place] [address] Ph #: [phone] Route: Oral  
 cetirizine (ZYRTEC) 10 mg tablet 0 Sig: Take 1 Tab by mouth daily. Class: Normal  
 Pharmacy: [id number], [address] [place] [address] Ph #: [phone] Route: Oral  
  
To-Do List   
 03/19/2018  9:30 AM  
  Appointment with [facility] CT 1 at [place] [id number] [facility] ([phone]) DIET RESTRICTIONS NPO, have nothing to eat or drink 4 hours prior to your exam.  EXAM INSTRUCTIONS - Please bring a list of all medications that you are currently taking, Include prescription and over the counter. - If you are allergic to IV/Contrast dye/IVP dye/iodine, an allergy prep is required. If your doctor did not give you a prescription for Benadryl or Prednisone, you may  a prescription from radiology between 8am-5pm.  Bring your written order for your exam when picking up your prep unless it has already been faxed by your physician. GENERAL INSTRUCTIONS - If you have a hand-written prescription for this exam, you must bring it with you on the day of your exam. - Bring all relevant prior images from facilities outside of [facility]. Failure to provide images may delay reading by Radiologist. - Only patients will be allowed in the exam room. This includes children. - Children under the age of 15 may not be left unattended. - [address] [place] [address] patients must have an armband and be accompanied by a caregiver or family member. - Rios Thomas may be responsible for any co-payments and deductibles as indicated by your insurance carrier.   APPOINTMENT CANCELLATION - To reschedule or cancel an appointment, please contact the Scheduling Department at [phone]  
  
 03/19/2018 10:00 AM  
  Appointment with [facility] 1 at [facility] [address] ([phone]) DIET RESTRICTIONS NPO, have nothing to eat or drink 4 hours prior to your exam.  EXAM INSTRUCTIONS - If your test requires you to drink oral contrast it may be picked up from radiology between 8am-5pm.  M-F Bring your written order for your exam when picking up your prep unless it has already been faxed by your physician. If you cannot make it between these times call the CT dept. [phone]. - If you are allergic to IV/Contrast dye/IVP dye/iodine, an  allergy prep is required. If your doctor did not give you a prescription for Benadryl or Prednisone, you may  a prescription from radiology between 8am-5pm.  Bring your written order for your exam when picking up your prep unless it has already been faxed by your physician. - Only patients will be allowed in the exam room. This includes children. - Children under the age of 15 may not be left unattended - Please bring a list of all medications that you are currently taking, include prescription and over the counter. GENERAL INSTRUCTIONS - If you have a hand-written prescription for this exam, you must bring it with you on the day of your exam. - Bring all relevant prior images from facilities outside of [facility]. Failure to provide images may   delay reading by Radiologist. - Only patients will be allowed in the exam room. This includes children. - Children under the age of 15 may not be left unattended. - [address] [place] [address] patients must have an armband and be accompanied by a caregiver or family member. - Jamila Lopez may be responsible for any co-payments and deductibles as indicated by your insurance carrier. APPOINTMENT CANCELLATION - To reschedule or cancel an appointment, please contact the Scheduling Department at [phone] Patient Instructions Learning About the [address] Diet What is the Mediterranean diet? The Mediterranean diet is a style of eating rather than a diet plan. It features foods eaten in [place], [place], [place] and [place], and other countries along the [facility]. It emphasizes eating foods like fish, fruits, vegetables, beans, high-fiber breads and whole grains, nuts, and olive oil. This style of eating includes limited red meat, cheese, and sweets. Why choose the Mediterranean diet? A Mediterranean-style diet may improve heart health. It contains more fat than other heart-healthy diets. But the fats are mainly from nuts, unsaturated oils (such as fish oils and olive oil), and certain nut or seed oils (such as canola, soybean, or flaxseed oil). These fats may help protect the heart and blood vessels. How can you get started on the Mediterranean diet? Here are some things you can do to switch to a more Mediterranean way of eating. What to eat · Eat a variety of fruits and vegetables each day, such as grapes, blueberries, tomatoes, broccoli, peppers, figs, olives, spinach, eggplant, beans, lentils, and chickpeas. · Eat a variety of whole-grain foods each day, such as oats, brown rice, and whole wheat bread, pasta, and couscous. · Eat fish at least 2 times a week. Try tuna, salmon, mackerel, lake trout, herring, or sardines. · Eat moderate amounts of low-fat dairy products, such as milk, cheese, or yogurt. · Eat moderate amounts of poultry and eggs. · Choose healthy (unsaturated) fats, such as nuts, olive oil, and certain nut or seed oils like canola, soybean, and flaxseed. · Limit unhealthy (saturated) fats, such as butter, palm oil, and coconut oil. And limit fats found in animal products, such as meat and dairy products made with whole milk. Try to eat red meat only a few times a month in very small amounts. · Limit sweets and desserts to only a few times a week. This includes sugar-sweetened drinks like soda. The Mediterranean diet may also include red wine with your meal-1 glass each day for women and up to 2 glasses a day for men. Tips for eating at home · Use herbs, spices, garlic, lemon zest, and citrus juice instead of salt to add flavor to foods. · Add avocado slices to your sandwich instead of cevallos. · Have fish for lunch or dinner instead of red meat. Brush the fish with olive oil, and broil or grill it. · Sprinkle your salad with seeds or nuts instead of cheese. · Cook with olive or canola oil instead of butter or oils that are high in saturated fat. · Switch from 2% milk or whole milk to 1% or fat-free milk. · Dip raw vegetables in a vinaigrette dressing or hummus instead of dips made from mayonnaise or sour cream. 
· Have a piece of fruit for dessert instead of a piece of cake. Try baked apples, or have some dried fruit. Tips for eating out · Try broiled, grilled, baked, or poached fish instead of having it fried or breaded. · Ask your  to have your meals prepared with olive oil instead of butter. · Order dishes made with marinara sauce or sauces made from olive oil. Avoid sauces made from cream or mayonnaise. · Choose whole-grain breads, whole wheat pasta and pizza crust, brown rice, beans, and lentils. · Cut back on butter or margarine on bread. Instead, you can dip your bread in a small amount of olive oil. · Ask for a side salad or grilled vegetables instead of french fries or chips. Where can you learn more? Go to http://tristan-marina.info/. Enter 113-747-5526 in the search box to learn more about \"Learning About the Mediterranean Diet. \" Current as of: May 12, 2017 Content Version: 11.4 © 0303-3195 Healthwise, [employer]. Care instructions adapted under license by [employer] (which disclaims liability or warranty for this information).  If you have questions about a medical condition or this instruction, always ask your healthcare professional. Fred Corey[employer] disclaims any warranty or liability for your use of this information. Upper Respiratory Infection (Cold): Care Instructions Your Care Instructions An upper respiratory infection, or URI, is an infection of the nose, sinuses, or throat. URIs are spread by coughs, sneezes, and direct contact. The common cold is the most frequent kind of URI. The flu and sinus infections are other kinds of URIs. Almost all URIs are caused by viruses. Antibiotics won't cure them. But you can treat most infections with home care. This may include drinking lots of fluids and taking over-the-counter pain medicine. You will probably feel better in 4 to 10 days. The doctor has checked you carefully, but problems can develop later. If you notice any problems or new symptoms, get medical treatment right away. Follow-up care is a key part of your treatment and safety. Be sure to make and go to all appointments, and call your doctor if you are having problems. It's also a good idea to know your test results and keep a list of the medicines you take. How can you care for yourself at home? · To prevent dehydration, drink plenty of fluids, enough so that your urine is light yellow or clear like water. Choose water and other caffeine-free clear liquids until you feel better. If you have kidney, heart, or liver disease and have to limit fluids, talk with your doctor before you increase the amount of fluids you drink. · Take an over-the-counter pain medicine, such as acetaminophen (Tylenol), ibuprofen (Advil, Motrin), or naproxen (Aleve). Read and follow all instructions on the label. · Before you use cough and cold medicines, check the label. These medicines may not be safe for young children or for people with certain health problems. · Be careful when taking over-the-counter cold or flu medicines and Tylenol at the same time.  Many of these medicines have acetaminophen, which is Tylenol. Read the labels to make sure that you are not taking more than the recommended dose. Too much acetaminophen (Tylenol) can be harmful. · Get plenty of rest. 
· Do not smoke or allow others to smoke around you. If you need help quitting, talk to your doctor about stop-smoking programs and medicines. These can increase your chances of quitting for good. When should you call for help? Call 911 anytime you think you may need emergency care. For example, call if: 
? · You have severe trouble breathing. ?Call your doctor now or seek immediate medical care if: 
? · You seem to be getting much sicker. ? · You have new or worse trouble breathing. ? · You have a new or higher fever. ? · You have a new rash. ? Watch closely for changes in your health, and be sure to contact your doctor if: 
? · You have a new symptom, such as a sore throat, an earache, or sinus pain. ? · You cough more deeply or more often, especially if you notice more mucus or a change in the color of your mucus. ? · You do not get better as expected. Where can you learn more? Go to http://tristan-marina.info/. Enter U523 in the search box to learn more about \"Upper Respiratory Infection (Cold): Care Instructions. \" Current as of: May 12, 2017 Content Version: 11.4 © 9350-1044 [employer]. Care instructions adapted under license by [employer] (which disclaims liability or warranty for this information). If you have questions about a medical condition or this instruction, always ask your healthcare professional. [place] [address] any warranty or liability for your use of this information. Introducing [facility]! Dear Tatiana Mares: Thank you for requesting a [employer] account. Our records indicate that you already have an active [employer] account. You can access your account anytime at [email or web address] Did you know that you can access your hospital and ER discharge instructions at any time in [employer]? You can also review all of your test results from your hospital stay or ER visit. Additional Information If you have questions, please visit the Frequently Asked Questions section of the [employer] website at [email or web address] Remember, [employer] is NOT to be used for urgent needs. For medical emergencies, dial 911. Now available from your iPhone and Android! Please provide this summary of care documentation to your next provider. Your primary care clinician is listed as LAYA CALABRESE. If you have any questions after today's visit, please call [phone].

## 2018-03-13 NOTE — PATIENT INSTRUCTIONS
Learning About the [facility] Diet  What is the Mediterranean diet? The Mediterranean diet is a style of eating rather than a diet plan. It features foods eaten in [place], [place], [place] and [place], and other countries along the [facility]. It emphasizes eating foods like fish, fruits, vegetables, beans, high-fiber breads and whole grains, nuts, and olive oil. This style of eating includes limited red meat, cheese, and sweets. Why choose the Mediterranean diet? A Mediterranean-style diet may improve heart health. It contains more fat than other heart-healthy diets. But the fats are mainly from nuts, unsaturated oils (such as fish oils and olive oil), and certain nut or seed oils (such as canola, soybean, or flaxseed oil). These fats may help protect the heart and blood vessels. How can you get started on the Mediterranean diet? Here are some things you can do to switch to a more Mediterranean way of eating. What to eat  · Eat a variety of fruits and vegetables each day, such as grapes, blueberries, tomatoes, broccoli, peppers, figs, olives, spinach, eggplant, beans, lentils, and chickpeas. · Eat a variety of whole-grain foods each day, such as oats, brown rice, and whole wheat bread, pasta, and couscous. · Eat fish at least 2 times a week. Try tuna, salmon, mackerel, lake trout, herring, or sardines. · Eat moderate amounts of low-fat dairy products, such as milk, cheese, or yogurt. · Eat moderate amounts of poultry and eggs. · Choose healthy (unsaturated) fats, such as nuts, olive oil, and certain nut or seed oils like canola, soybean, and flaxseed. · Limit unhealthy (saturated) fats, such as butter, palm oil, and coconut oil. And limit fats found in animal products, such as meat and dairy products made with whole milk. Try to eat red meat only a few times a month in very small amounts. · Limit sweets and desserts to only a few times a week.  This includes sugar-sweetened drinks like soda. The Mediterranean diet may also include red wine with your meal-1 glass each day for women and up to 2 glasses a day for men. Tips for eating at home  · Use herbs, spices, garlic, lemon zest, and citrus juice instead of salt to add flavor to foods. · Add avocado slices to your sandwich instead of cevallos. · Have fish for lunch or dinner instead of red meat. Brush the fish with olive oil, and broil or grill it. · Sprinkle your salad with seeds or nuts instead of cheese. · Cook with olive or canola oil instead of butter or oils that are high in saturated fat. · Switch from 2% milk or whole milk to 1% or fat-free milk. · Dip raw vegetables in a vinaigrette dressing or hummus instead of dips made from mayonnaise or sour cream.  · Have a piece of fruit for dessert instead of a piece of cake. Try baked apples, or have some dried fruit. Tips for eating out  · Try broiled, grilled, baked, or poached fish instead of having it fried or breaded. · Ask your  to have your meals prepared with olive oil instead of butter. · Order dishes made with marinara sauce or sauces made from olive oil. Avoid sauces made from cream or mayonnaise. · Choose whole-grain breads, whole wheat pasta and pizza crust, brown rice, beans, and lentils. · Cut back on butter or margarine on bread. Instead, you can dip your bread in a small amount of olive oil. · Ask for a side salad or grilled vegetables instead of french fries or chips. Where can you learn more? Go to http://tristan-marina.info/. Enter 219-180-1951 in the search box to learn more about \"Learning About the Mediterranean Diet. \"  Current as of: May 12, 2017  Content Version: 11.4  © 7378-9672 [employer]. Care instructions adapted under license by [employer] (which disclaims liability or warranty for this information).  If you have questions about a medical condition or this instruction, always ask your healthcare professional. Healthwise, [facility] disclaims any warranty or liability for your use of this information. Upper Respiratory Infection (Cold): Care Instructions  Your Care Instructions    An upper respiratory infection, or URI, is an infection of the nose, sinuses, or throat. URIs are spread by coughs, sneezes, and direct contact. The common cold is the most frequent kind of URI. The flu and sinus infections are other kinds of URIs. Almost all URIs are caused by viruses. Antibiotics won't cure them. But you can treat most infections with home care. This may include drinking lots of fluids and taking over-the-counter pain medicine. You will probably feel better in 4 to 10 days. The doctor has checked you carefully, but problems can develop later. If you notice any problems or new symptoms, get medical treatment right away. Follow-up care is a key part of your treatment and safety. Be sure to make and go to all appointments, and call your doctor if you are having problems. It's also a good idea to know your test results and keep a list of the medicines you take. How can you care for yourself at home? · To prevent dehydration, drink plenty of fluids, enough so that your urine is light yellow or clear like water. Choose water and other caffeine-free clear liquids until you feel better. If you have kidney, heart, or liver disease and have to limit fluids, talk with your doctor before you increase the amount of fluids you drink. · Take an over-the-counter pain medicine, such as acetaminophen (Tylenol), ibuprofen (Advil, Motrin), or naproxen (Aleve). Read and follow all instructions on the label. · Before you use cough and cold medicines, check the label. These medicines may not be safe for young children or for people with certain health problems. · Be careful when taking over-the-counter cold or flu medicines and Tylenol at the same time. Many of these medicines have acetaminophen, which is Tylenol.  Read the labels to make sure that you are not taking more than the recommended dose. Too much acetaminophen (Tylenol) can be harmful. · Get plenty of rest.  · Do not smoke or allow others to smoke around you. If you need help quitting, talk to your doctor about stop-smoking programs and medicines. These can increase your chances of quitting for good. When should you call for help? Call 911 anytime you think you may need emergency care. For example, call if:  ? · You have severe trouble breathing. ?Call your doctor now or seek immediate medical care if:  ? · You seem to be getting much sicker. ? · You have new or worse trouble breathing. ? · You have a new or higher fever. ? · You have a new rash. ? Watch closely for changes in your health, and be sure to contact your doctor if:  ? · You have a new symptom, such as a sore throat, an earache, or sinus pain. ? · You cough more deeply or more often, especially if you notice more mucus or a change in the color of your mucus. ? · You do not get better as expected. Where can you learn more? Go to http://tristan-marina.info/. Enter G328 in the search box to learn more about \"Upper Respiratory Infection (Cold): Care Instructions. \"  Current as of: May 12, 2017  Content Version: 11.4  © 7159-5046 [employer]. Care instructions adapted under license by [employer] (which disclaims liability or warranty for this information). If you have questions about a medical condition or this instruction, always ask your healthcare professional. [place] [address] any warranty or liability for your use of this information.

## 2018-03-13 NOTE — PROGRESS NOTES
Chief Complaint   Patient presents with    Cold Symptoms       HPI:  Patient is a 57year old male presents today for an acute visit with cold complaints. Symptoms started one week ago with cough that is productive of clear sputum, and associated is chest congestion and nasal congestion, though he denies fever, chills, sore throat, or body aches. He took Flonase and Sudafed with no significant improvement and thus he came in today for evaluation. Past Medical History:   Diagnosis Date    Agatston CAC score 200-399 12/03/2015    Coronary calcium score 331.  Essential hypertension, benign     History of tobacco use     quit 1/2016    Hoarseness of voice     Joint pain     Mediastinal mass     Pure hypercholesterolemia     Sleep apnea     CPAP    Tobacco dependency      Allergies   Allergen Reactions    Iodine And Iodide Containing Products Nausea and Vomiting    Tetracycline Itching     Current Outpatient Prescriptions   Medication Sig Dispense Refill    prochlorperazine (COMPAZINE) 10 mg tablet Take 10 mg by mouth.  ondansetron hcl (ZOFRAN) 8 mg tablet Take 8 mg by mouth.  atorvastatin (LIPITOR) 80 mg tablet TAKE 1 TABLET BY MOUTH DAILY 90 Tab 3    docusate sodium (COLACE) 100 mg capsule Take 100 mg by mouth daily.  oxyCODONE IR (ROXICODONE) 5 mg immediate release tablet Take 1-2 Tabs by mouth every four (4) hours as needed for Pain.  Max Daily Amount: 60 mg. 30 Tab 0    lisinopril-hydroCHLOROthiazide (PRINZIDE, ZESTORETIC) 20-25 mg per tablet TAKE 1 TABLET BY MOUTH DAILY 90 Tab 3            ROS:  Pertinent as in HPI      Physical Exam:  Visit Vitals    BP 96/66 (BP 1 Location: Left arm, BP Patient Position: Sitting)    Pulse 91    Temp 98.8 °F (37.1 °C) (Oral)    Resp 18    Ht 5' 11\" (1.803 m)    Wt 237 lb 3.2 oz (107.6 kg)    SpO2 95%    BMI 33.08 kg/m2     General: a & o x 3, afebrile, well-nourished, interacting appropriately, in no acute distress  HEENT: Mucosal edema and erythema noted  Neck: supple, symmetrical, no thyromegaly  Respiratory: symmetrical chest expansion, lung sounds clear bilaterally  Cardiovascular: normal S1S2, regular rate and rhythm        Assessment/Plan:    ICD-10-CM ICD-9-CM    1. Upper respiratory tract infection, unspecified type J06.9 465.9 predniSONE (STERAPRED DS) 10 mg dose pack      cefdinir (OMNICEF) 300 mg capsule   2. Cough R05 786.2 chlorpheniramine-HYDROcodone (TUSSIONEX) 10-8 mg/5 mL suspension      DISCONTINUED: chlorpheniramine-HYDROcodone (TUSSIONEX) 10-8 mg/5 mL suspension   3. Nasal congestion R09.81 478.19 cetirizine (ZYRTEC) 10 mg tablet         Orders Placed This Encounter    prochlorperazine (COMPAZINE) 10 mg tablet     Sig: Take 10 mg by mouth.  ondansetron hcl (ZOFRAN) 8 mg tablet     Sig: Take 8 mg by mouth.  predniSONE (STERAPRED DS) 10 mg dose pack     Sig: See administration instruction per 10mg dose pack     Dispense:  21 Tab     Refill:  0    DISCONTD: chlorpheniramine-HYDROcodone (TUSSIONEX) 10-8 mg/5 mL suspension     Sig: Take 5 mL by mouth every twelve (12) hours as needed for Cough. Max Daily Amount: 10 mL. Dispense:  240 mL     Refill:  0    cefdinir (OMNICEF) 300 mg capsule     Sig: Take 1 Cap by mouth two (2) times a day for 10 days. Dispense:  20 Cap     Refill:  0    cetirizine (ZYRTEC) 10 mg tablet     Sig: Take 1 Tab by mouth daily. Dispense:  30 Tab     Refill:  0    chlorpheniramine-HYDROcodone (TUSSIONEX) 10-8 mg/5 mL suspension     Sig: Take 5 mL by mouth every twelve (12) hours as needed for Cough. Max Daily Amount: 10 mL. Dispense:  240 mL     Refill:  0       Additional Notes: Discussed today's diagnosis, treatment plans. Discussed medication indications and side effects. After Visit Summary: Discussed provided printed patient instructions. Answered questions accordingly.   Follow-up Disposition: As previously scheduled with PCP        Skyler Niño DO, MPH  Internal Medicine

## 2018-03-13 NOTE — PROGRESS NOTES
Fercho Brown is a  57 y.o. male presents today for office visit for routine congestion x 1week. Patient denies sore throat, fever , chills and body aches. 1. Have you been to the ER, urgent care clinic or hospitalized since your last visit NO    2. Have you seen or consulted any other health care providers outside of the [facility] since your last visit (Include any pap smears or colon screening)? YES Oncologist

## 2018-03-16 ENCOUNTER — OFFICE VISIT (OUTPATIENT)
Dept: FAMILY MEDICINE CLINIC | Age: 63
End: 2018-03-16

## 2018-03-16 VITALS
HEIGHT: 71 IN | RESPIRATION RATE: 20 BRPM | HEART RATE: 100 BPM | WEIGHT: 235.8 LBS | SYSTOLIC BLOOD PRESSURE: 135 MMHG | DIASTOLIC BLOOD PRESSURE: 71 MMHG | OXYGEN SATURATION: 95 % | TEMPERATURE: 98 F | BODY MASS INDEX: 33.01 KG/M2

## 2018-03-16 DIAGNOSIS — I10 ESSENTIAL HYPERTENSION, BENIGN: Primary | ICD-10-CM

## 2018-03-16 DIAGNOSIS — E78.00 PURE HYPERCHOLESTEROLEMIA: ICD-10-CM

## 2018-03-16 NOTE — PATIENT INSTRUCTIONS

## 2018-03-16 NOTE — PROGRESS NOTES
Patient is in the office today for a 6 month follow up. 1. Have you been to the ER, urgent care clinic since your last visit? Hospitalized since your last visit? No    2. Have you seen or consulted any other health care providers outside of the [address] [place] [address] since your last visit? Include any pap smears or colon screening.   Yes Dr. Gold Oakes Oncologist.

## 2018-03-16 NOTE — PROGRESS NOTES
Subjective:       Chief Complaint  The patient presents for follow up of hypertension and high cholesterol. Obesity, CAD         HPI  Tao Gonzalez Record is a 57 y.o. male seen for follow up of hyperlipidemia. Healso has hypertension. Hypertension well controlled, no significant medication side effects noted, on Zestoretic, hyperlipidemia better controlled with pt being more consistent with taking medications, no significant medication side effects noted, on Lipitor 80 mg but with pt having CAC score 331 he needs to get LDL<70. Pt has stopped tobacco use. Diet and Lifestyle: not attempting to follow a low fat, low cholesterol diet, exercises sporadically    Home BP Monitoring: is not measured at home. Other symptoms and concerns: pt has been recently diagnosed with stage 4 esophageal cancer. He is working with his oncologist and takingt chemotherapy. Pt has a good mental attitude. Current Outpatient Prescriptions   Medication Sig    predniSONE (STERAPRED DS) 10 mg dose pack See administration instruction per 10mg dose pack    cefdinir (OMNICEF) 300 mg capsule Take 1 Cap by mouth two (2) times a day for 10 days.  atorvastatin (LIPITOR) 80 mg tablet TAKE 1 TABLET BY MOUTH DAILY    lisinopril-hydroCHLOROthiazide (PRINZIDE, ZESTORETIC) 20-25 mg per tablet TAKE 1 TABLET BY MOUTH DAILY    prochlorperazine (COMPAZINE) 10 mg tablet Take 10 mg by mouth.  ondansetron hcl (ZOFRAN) 8 mg tablet Take 8 mg by mouth.  cetirizine (ZYRTEC) 10 mg tablet Take 1 Tab by mouth daily.  chlorpheniramine-HYDROcodone (TUSSIONEX) 10-8 mg/5 mL suspension Take 5 mL by mouth every twelve (12) hours as needed for Cough. Max Daily Amount: 10 mL.  docusate sodium (COLACE) 100 mg capsule Take 100 mg by mouth daily.  oxyCODONE IR (ROXICODONE) 5 mg immediate release tablet Take 1-2 Tabs by mouth every four (4) hours as needed for Pain. Max Daily Amount: 60 mg.      No current facility-administered medications for this visit. Review of Systems  Respiratory: negative for dyspnea on exertion  Cardiovascular: negative for chest pain    Objective:     Visit Vitals    /71 (BP 1 Location: Left arm, BP Patient Position: Sitting)    Pulse 100    Temp 98 °F (36.7 °C) (Oral)    Resp 20    Ht 5' 11\" (1.803 m)    Wt 235 lb 12.8 oz (107 kg)    SpO2 95%    BMI 32.89 kg/m2        General appearance - alert, well appearing, and in no distress  Neck - supple, no significant adenopathy, carotids upstroke normal bilaterally, no bruits  Chest - clear to auscultation, no wheezes, rales or rhonchi, symmetric air entry  Heart - normal rate, regular rhythm, normal S1, S2, no murmurs, rubs, clicks or gallops  Extremities - peripheral pulses normal, no pedal edema, no clubbing or cyanosis  Skin - normal coloration and turgor, no rashes, no suspicious skin lesions noted      Labs:   Lab Results   Component Value Date/Time    Cholesterol, total 157 03/02/2018 09:45 AM    HDL Cholesterol 52 03/02/2018 09:45 AM    LDL, calculated 87 03/02/2018 09:45 AM    Triglyceride 93 03/02/2018 09:45 AM    CHOL/HDL Ratio 3.1 12/04/2015 09:42 AM     Lab Results   Component Value Date/Time    ALT (SGPT) 34 03/02/2018 09:45 AM    AST (SGOT) 32 03/02/2018 09:45 AM    Alk. phosphatase 76 03/02/2018 09:45 AM    Bilirubin, total 0.3 03/02/2018 09:45 AM     Lab Results   Component Value Date/Time    GFR est AA >60 08/21/2017 11:20 AM    GFR est non-AA >60 08/21/2017 11:20 AM    Creatinine 0.9 03/02/2018 09:45 AM    BUN 12 03/02/2018 09:45 AM    Sodium 139 03/02/2018 09:45 AM    Potassium 3.7 03/02/2018 09:45 AM    Chloride 99 03/02/2018 09:45 AM    CO2 23 03/02/2018 09:45 AM            Assessment / Plan     Hypertension well controlled, on current meds  Hyperlipidemia well controlled, on Lipitor 80 mg daily. ICD-10-CM ICD-9-CM    1. Essential hypertension, benign G91 348.5 METABOLIC PANEL, COMPREHENSIVE   2.  Pure hypercholesterolemia E78.00 272.0 LIPID PANEL               Low cholesterol diet, weight control and daily exercise discussed. Follow-up Disposition:  Return in about 6 months (around 9/16/2018) for labs 1 week before. Reviewed plan of care. Patient has provided input and agrees with goals.

## 2018-03-16 NOTE — MR AVS SNAPSHOT
Griffin Cocking 
 
 
 [address] [address] 
[phone] Patient: Secundino Klinefelter MRN:  :[id number] Visit Information Date & Time Provider Department Dept. Phone Encounter #  
 3/16/2018  9:15 AM Jose Kang, [address] [place] [address] [phone] [id number] Follow-up Instructions Return in about 6 months (around 2018) for labs 1 week before. Your Appointments 9/10/2018 12:40 PM  
Follow Up with Karyna Santos DO [facility] ([address]) Appt Note: 8 months follow up [place] [address] [address] [address] [phone] [facility] [address] Upcoming Health Maintenance Date Due Pneumococcal 19-64 Highest Risk (1 of 3 - PCV13) 1972 COLONOSCOPY 2024 DTaP/Tdap/Td series (2 - Td) 2024 Allergies as of 3/16/2018  Review Complete On: 3/16/2018 By: Jose Kang MD  
  
 Severity Noted Reaction Type Reactions Iodine And Iodide Containing Products High 2018    Nausea and Vomiting Tetracycline  2010    Itching Current Immunizations  Reviewed on 9/15/2017 Name Date Influenza Vaccine 2014 Influenza Vaccine (Quad) PF 9/15/2017, 2017  2:52 PM, 2015 Influenza Vaccine PF 2013 Tdap 2014 Zoster Vaccine, Live 2016 Not reviewed this visit You Were Diagnosed With   
  
 Codes Comments Essential hypertension, benign    -  Primary ICD-10-CM: I10 
ICD-9-CM: 401.1 Pure hypercholesterolemia     ICD-10-CM: E78.00 ICD-9-CM: 272.0 Vitals BP Pulse Temp Resp Height(growth percentile) Weight(growth percentile) 135/71 (BP 1 Location: Left arm, BP Patient Position: Sitting) 100 98 °F (36.7 °C) (Oral) 20 5' 11\" (1.803 m) 235 lb 12.8 oz (107 kg) SpO2 BMI Smoking Status 95% 32.89 kg/m2 Former Smoker BMI and BSA Data Body Mass Index Body Surface Area  
 32.89 kg/m 2 2.32 m 2 Preferred Pharmacy Pharmacy Name Phone [address], [address] [facility] [phone] Your Updated Medication List  
  
   
This list is accurate as of 3/16/18 11:01 AM.  Always use your most recent med list.  
  
  
  
  
 atorvastatin 80 mg tablet Commonly known as:  LIPITOR  
TAKE 1 TABLET BY MOUTH DAILY  
  
 cefdinir 300 mg capsule Commonly known as:  OMNICEF Take 1 Cap by mouth two (2) times a day for 10 days. cetirizine 10 mg tablet Commonly known as:  ZYRTEC Take 1 Tab by mouth daily. chlorpheniramine-HYDROcodone 10-8 mg/5 mL suspension Commonly known as:  Harriett Balboa Take 5 mL by mouth every twelve (12) hours as needed for Cough. Max Daily Amount: 10 mL. COLACE 100 mg capsule Generic drug:  docusate sodium Take 100 mg by mouth daily. COMPAZINE 10 mg tablet Generic drug:  prochlorperazine Take 10 mg by mouth.  
  
 lisinopril-hydroCHLOROthiazide 20-25 mg per tablet Commonly known as:  PRINZIDE, ZESTORETIC  
TAKE 1 TABLET BY MOUTH DAILY  
  
 ondansetron hcl 8 mg tablet Commonly known as:  Floyde Hopes Take 8 mg by mouth. oxyCODONE IR 5 mg immediate release tablet Commonly known as:  Almazan Lobe Take 1-2 Tabs by mouth every four (4) hours as needed for Pain. Max Daily Amount: 60 mg.  
  
 predniSONE 10 mg dose pack Commonly known as:  STERAPRED DS See administration instruction per 10mg dose pack Follow-up Instructions Return in about 6 months (around 9/16/2018) for labs 1 week before. To-Do List   
 03/19/2018  9:30 AM  
  Appointment with [facility] CT 1 at [facility] [address] ([phone]) DIET RESTRICTIONS NPO, have nothing to eat or drink 4 hours prior to your exam.  EXAM INSTRUCTIONS - Please bring a list of all medications that you are currently taking, Include prescription and over the counter. - If you are allergic to IV/Contrast dye/IVP dye/iodine, an allergy prep is required. If your doctor did not give you a prescription for Benadryl or Prednisone, you may  a prescription from radiology between 8am-5pm.  Bring your written order for your exam when picking up your prep unless it has already been faxed by your physician. GENERAL INSTRUCTIONS - If you have a hand-written prescription for this exam, you must bring it with you on the day of your exam. - Bring all relevant prior images from facilities outside of [facility]. Failure to provide images may delay reading by Radiologist. - Only patients will be allowed in the exam room. This includes children. - Children under the age of 15 may not be left unattended. - [address] [place] [address] patients must have an armband and be accompanied by a caregiver or family member. - [employer] may be responsible for any co-payments and deductibles as indicated by your insurance carrier. APPOINTMENT CANCELLATION - To reschedule or cancel an appointment, please contact the Scheduling Department at [phone]  
  
 03/19/2018 10:00 AM  
  Appointment with [address] at [address] ([phone]) DIET RESTRICTIONS NPO, have nothing to eat or drink 4 hours prior to your exam.  EXAM INSTRUCTIONS - If your test requires you to drink oral contrast it may be picked up from radiology between 8am-5pm.  M-F Bring your written order for your exam when picking up your prep unless it has already been faxed by your physician. If you cannot make it between these times call the CT dept. [phone]. - If you are allergic to IV/Contrast dye/IVP dye/iodine, an  allergy prep is required.   If your doctor did not give you a prescription for Benadryl or Prednisone, you may  a prescription from radiology between 8am-5pm.  Bring your written order for your exam when picking up your prep unless it has already been faxed by your physician. - Only patients will be allowed in the exam room. This includes children. - Children under the age of 15 may not be left unattended - Please bring a list of all medications that you are currently taking, include prescription and over the counter. GENERAL INSTRUCTIONS - If you have a hand-written prescription for this exam, you must bring it with you on the day of your exam. - Bring all relevant prior images from facilities outside of [facility]. Failure to provide images may   delay reading by Radiologist. - Only patients will be allowed in the exam room. This includes children. - Children under the age of 15 may not be left unattended. - [address] [place] [address] patients must have an armband and be accompanied by a caregiver or family member. - Loulou Cheng may be responsible for any co-payments and deductibles as indicated by your insurance carrier. APPOINTMENT CANCELLATION - To reschedule or cancel an appointment, please contact the Scheduling Department at [phone] Patient Instructions Heart-Healthy Diet: Care Instructions Your Care Instructions A heart-healthy diet has lots of vegetables, fruits, nuts, beans, and whole grains, and is low in salt. It limits foods that are high in saturated fat, such as meats, cheeses, and fried foods. It may be hard to change your diet, but even small changes can lower your risk of heart attack and heart disease. Follow-up care is a key part of your treatment and safety. Be sure to make and go to all appointments, and call your doctor if you are having problems. It's also a good idea to know your test results and keep a list of the medicines you take. How can you care for yourself at home? Watch your portions · Learn what a serving is.  A \"serving\" and a \"portion\" are not always the same thing. Make sure that you are not eating larger portions than are recommended. For example, a serving of pasta is ½ cup. A serving size of meat is 2 to 3 ounces. A 3-ounce serving is about the size of a deck of cards. Measure serving sizes until you are good at [place]" them. Keep in mind that restaurants often serve portions that are 2 or 3 times the size of one serving. · To keep your energy level up and keep you from feeling hungry, eat often but in smaller portions. · Eat only the number of calories you need to stay at a healthy weight. If you need to lose weight, eat fewer calories than your body burns (through exercise and other physical activity). Eat more fruits and vegetables · Eat a variety of fruit and vegetables every day. Dark green, deep orange, red, or yellow fruits and vegetables are especially good for you. Examples include spinach, carrots, peaches, and berries. · Keep carrots, celery, and other veggies handy for snacks. Buy fruit that is in season and store it where you can see it so that you will be tempted to eat it. · Cook dishes that have a lot of veggies in them, such as stir-fries and soups. Limit saturated and trans fat · Read food labels, and try to avoid saturated and trans fats. They increase your risk of heart disease. Trans fat is found in many processed foods such as cookies and crackers. · Use olive or canola oil when you cook. Try cholesterol-lowering spreads, such as Benecol or Take Control. · Bake, broil, grill, or steam foods instead of frying them. · Choose lean meats instead of high-fat meats such as hot dogs and sausages. Cut off all visible fat when you prepare meat. · Eat fish, skinless poultry, and meat alternatives such as soy products instead of high-fat meats. Soy products, such as tofu, may be especially good for your heart. · Choose low-fat or fat-free milk and dairy products. Eat fish · Eat at least two servings of fish a week. Certain fish, such as salmon and tuna, contain omega-3 fatty acids, which may help reduce your risk of heart attack. Eat foods high in fiber · Eat a variety of grain products every day. Include whole-grain foods that have lots of fiber and nutrients. Examples of whole-grain foods include oats, whole wheat bread, and brown rice. · Buy whole-grain breads and cereals, instead of white bread or pastries. Limit salt and sodium · Limit how much salt and sodium you eat to help lower your blood pressure. · Taste food before you salt it. Add only a little salt when you think you need it. With time, your taste buds will adjust to less salt. · Eat fewer snack items, fast foods, and other high-salt, processed foods. Check food labels for the amount of sodium in packaged foods. · Choose low-sodium versions of canned goods (such as soups, vegetables, and beans). Limit sugar · Limit drinks and foods with added sugar. These include candy, desserts, and soda pop. Limit alcohol · Limit alcohol to no more than 2 drinks a day for men and 1 drink a day for women. Too much alcohol can cause health problems. When should you call for help? Watch closely for changes in your health, and be sure to contact your doctor if: 
? · You would like help planning heart-healthy meals. Where can you learn more? Go to http://tristan-marina.info/. Enter V137 in the search box to learn more about \"Heart-Healthy Diet: Care Instructions. \" Current as of: September 21, 2016 Content Version: 11.4 © 1935-6888 Wallarm. Care instructions adapted under license by [employer] (which disclaims liability or warranty for this information). If you have questions about a medical condition or this instruction, always ask your healthcare professional. [address] any warranty or liability for your use of this information. Introducing [facility]! Dear Andres Vasquez: Thank you for requesting a LiquidCompass account. Our records indicate that you already have an active LiquidCompass account. You can access your account anytime at [email or web address] Did you know that you can access your hospital and ER discharge instructions at any time in LiquidCompass? You can also review all of your test results from your hospital stay or ER visit. Additional Information If you have questions, please visit the Frequently Asked Questions section of the LiquidCompass website at [email or web address] Remember, LiquidCompass is NOT to be used for urgent needs. For medical emergencies, dial 911. Now available from your iPhone and Android! Please provide this summary of care documentation to your next provider. Your primary care clinician is listed as LAYA CALABRESE. If you have any questions after today's visit, please call [phone].

## 2018-05-10 RX ORDER — LISINOPRIL AND HYDROCHLOROTHIAZIDE 20; 25 MG/1; MG/1
TABLET ORAL
Qty: 90 TAB | Refills: 3 | Status: SHIPPED | OUTPATIENT
Start: 2018-05-10 | End: 2019-01-01 | Stop reason: SDUPTHER

## 2018-06-01 ENCOUNTER — OFFICE VISIT (OUTPATIENT)
Dept: FAMILY MEDICINE CLINIC | Age: 63
End: 2018-06-01

## 2018-06-01 VITALS
WEIGHT: 246 LBS | HEIGHT: 71 IN | TEMPERATURE: 98 F | OXYGEN SATURATION: 98 % | DIASTOLIC BLOOD PRESSURE: 63 MMHG | RESPIRATION RATE: 20 BRPM | BODY MASS INDEX: 34.44 KG/M2 | HEART RATE: 81 BPM | SYSTOLIC BLOOD PRESSURE: 100 MMHG

## 2018-06-01 DIAGNOSIS — E78.00 PURE HYPERCHOLESTEROLEMIA: ICD-10-CM

## 2018-06-01 DIAGNOSIS — K08.9 TOOTH DISORDER: ICD-10-CM

## 2018-06-01 DIAGNOSIS — I10 ESSENTIAL HYPERTENSION, BENIGN: Primary | ICD-10-CM

## 2018-06-01 DIAGNOSIS — C15.9 ADENOCARCINOMA OF ESOPHAGUS, STAGE 4 (HCC): ICD-10-CM

## 2018-06-01 NOTE — PATIENT INSTRUCTIONS

## 2018-06-01 NOTE — MR AVS SNAPSHOT
[facility] 
 
 
 [address], [place] [address] [address] 
[phone] Patient: Suyapa Thapa MRN:  :[id number] Visit Information Date & Time Provider Department Dept. Phone Encounter #  
 2018  3:30 PM Darlene Hankisn, [address] [place] [address] [phone] [id number] Follow-up Instructions Return if symptoms worsen or fail to improve. Your Appointments 9/10/2018  7:45 AM  
LAB with [facility] ([place]) Appt Note: labs [address] [address] [address]  
[phone]  
  
   
 [address] [facility]  
  
    
 9/10/2018 12:40 PM  
Follow Up with Charlena Schaumann, DO [facility] ([address]) Appt Note: 8 months follow up [facility] [address] [address] [address] [phone] [facility] [address] 2018 10:00 AM  
Office Visit with Darlene Hankins MD  
[place] [address] Primary Care [address] [place] [address]) Appt Note: 6m fu labs 1 wk prior [facility] [address] [address]  
[phone]  
  
   
 [address] Upcoming Health Maintenance Date Due Pneumococcal 19-64 Highest Risk (1 of 3 - PCV13) 1972 Influenza Age 5 to Adult 2018 COLONOSCOPY 2024 DTaP/Tdap/Td series (2 - Td) 2024 Allergies as of 2018  Review Complete On: 2018 By: Sahil Herman LPN Severity Noted Reaction Type Reactions Iodine And Iodide Containing Products High 2018    Nausea and Vomiting Tetracycline  2010    Itching Current Immunizations  Reviewed on 9/15/2017 Name Date Influenza Vaccine 2014 Influenza Vaccine (Quad) PF 9/15/2017, 2017  2:52 PM, 2015 Influenza Vaccine PF 2013 Tdap 2014 Zoster Vaccine, Live 5/31/2016 Not reviewed this visit You Were Diagnosed With   
  
 Codes Comments Essential hypertension, benign    -  Primary ICD-10-CM: I10 
ICD-9-CM: 401.1 Vitals BP Pulse Temp Resp Height(growth percentile) Weight(growth percentile) 100/63 (BP 1 Location: Left arm, BP Patient Position: Sitting) 81 98 °F (36.7 °C) (Oral) 20 5' 11\" (1.803 m) 246 lb (111.6 kg) SpO2 BMI Smoking Status 98% 34.31 kg/m2 Former Smoker BMI and BSA Data Body Mass Index Body Surface Area  
 34.31 kg/m 2 2.36 m 2 Preferred Pharmacy Pharmacy Name Phone [address], [address] [place] [address] [phone] Your Updated Medication List  
  
   
This list is accurate as of 6/1/18  3:42 PM.  Always use your most recent med list.  
  
  
  
  
 atorvastatin 80 mg tablet Commonly known as:  LIPITOR  
TAKE 1 TABLET BY MOUTH DAILY  
  
 cetirizine 10 mg tablet Commonly known as:  ZYRTEC Take 1 Tab by mouth daily. chlorpheniramine-HYDROcodone 10-8 mg/5 mL suspension Commonly known as:  Alphonse Benjamín Take 5 mL by mouth every twelve (12) hours as needed for Cough. Max Daily Amount: 10 mL. COLACE 100 mg capsule Generic drug:  docusate sodium Take 100 mg by mouth daily. COMPAZINE 10 mg tablet Generic drug:  prochlorperazine Take 10 mg by mouth.  
  
 lisinopril-hydroCHLOROthiazide 20-25 mg per tablet Commonly known as:  PRINZIDE, ZESTORETIC  
TAKE 1 TABLET BY MOUTH ONCE DAILY  
  
 ondansetron hcl 8 mg tablet Commonly known as:  Thomasenia Ratel Take 8 mg by mouth. oxyCODONE IR 5 mg immediate release tablet Commonly known as:  Onita Estimable Take 1-2 Tabs by mouth every four (4) hours as needed for Pain. Max Daily Amount: 60 mg. Follow-up Instructions Return if symptoms worsen or fail to improve. Patient Instructions A Healthy Lifestyle: Care Instructions Your Care Instructions A healthy lifestyle can help you feel good, stay at a healthy weight, and have plenty of energy for both work and play. A healthy lifestyle is something you can share with your whole family. A healthy lifestyle also can lower your risk for serious health problems, such as high blood pressure, heart disease, and diabetes. You can follow a few steps listed below to improve your health and the health of your family. Follow-up care is a key part of your treatment and safety. Be sure to make and go to all appointments, and call your doctor if you are having problems. It's also a good idea to know your test results and keep a list of the medicines you take. How can you care for yourself at home? · Do not eat too much sugar, fat, or fast foods. You can still have dessert and treats now and then. The goal is moderation. · Start small to improve your eating habits. Pay attention to portion sizes, drink less juice and soda pop, and eat more fruits and vegetables. ¨ Eat a healthy amount of food. A 3-ounce serving of meat, for example, is about the size of a deck of cards. Fill the rest of your plate with vegetables and whole grains. ¨ Limit the amount of soda and sports drinks you have every day. Drink more water when you are thirsty. ¨ Eat at least 5 servings of fruits and vegetables every day. It may seem like a lot, but it is not hard to reach this goal. A serving or helping is 1 piece of fruit, 1 cup of vegetables, or 2 cups of leafy, raw vegetables. Have an apple or some carrot sticks as an afternoon snack instead of a candy bar. Try to have fruits and/or vegetables at every meal. 
· Make exercise part of your daily routine. You may want to start with simple activities, such as walking, bicycling, or slow swimming. Try to be active 30 to 60 minutes every day. You do not need to do all 30 to 60 minutes all at once.  For example, you can exercise 3 times a day for 10 or 20 minutes. Moderate exercise is safe for most people, but it is always a good idea to talk to your doctor before starting an exercise program. 
· Keep moving. Caleen Newer the lawn, work in the garden, or Itiva. Take the stairs instead of the elevator at work. · If you smoke, quit. People who smoke have an increased risk for heart attack, stroke, cancer, and other lung illnesses. Quitting is hard, but there are ways to boost your chance of quitting tobacco for good. ¨ Use nicotine gum, patches, or lozenges. ¨ Ask your doctor about stop-smoking programs and medicines. ¨ Keep trying. In addition to reducing your risk of diseases in the future, you will notice some benefits soon after you stop using tobacco. If you have shortness of breath or asthma symptoms, they will likely get better within a few weeks after you quit. · Limit how much alcohol you drink. Moderate amounts of alcohol (up to 2 drinks a day for men, 1 drink a day for women) are okay. But drinking too much can lead to liver problems, high blood pressure, and other health problems. Family health If you have a family, there are many things you can do together to improve your health. · Eat meals together as a family as often as possible. · Eat healthy foods. This includes fruits, vegetables, lean meats and dairy, and whole grains. · Include your family in your fitness plan. Most people think of activities such as jogging or tennis as the way to fitness, but there are many ways you and your family can be more active. Anything that makes you breathe hard and gets your heart pumping is exercise. Here are some tips: 
¨ Walk to do errands or to take your child to school or the bus. ¨ Go for a family bike ride after dinner instead of watching TV. Where can you learn more? Go to http://tristan-marina.info/. Enter P357 in the search box to learn more about \"A Healthy Lifestyle: Care Instructions. \" Current as of: May 12, 2017 Content Version: 11.4 © 9406-6608 Healthwise, LiveU. Care instructions adapted under license by [employer] (which disclaims liability or warranty for this information). If you have questions about a medical condition or this instruction, always ask your healthcare professional. [address] any warranty or liability for your use of this information. Introducing [facility]! Dear Aleah Hamilton: Thank you for requesting a FireLayers account. Our records indicate that you already have an active FireLayers account. You can access your account anytime at [email or web address] Did you know that you can access your hospital and ER discharge instructions at any time in FireLayers? You can also review all of your test results from your hospital stay or ER visit. Additional Information If you have questions, please visit the Frequently Asked Questions section of the FireLayers website at [email or web address] Remember, FireLayers is NOT to be used for urgent needs. For medical emergencies, dial 911. Now available from your iPhone and Android! Please provide this summary of care documentation to your next provider. Your primary care clinician is listed as LAYA CALABRESE. If you have any questions after today's visit, please call [phone].

## 2018-06-01 NOTE — PROGRESS NOTES
Patient is in the office today for a pre op for oral surgery. 1. Have you been to the ER, urgent care clinic since your last visit? Hospitalized since your last visit? No    2. Have you seen or consulted any other health care providers outside of the [address] [place] [address] since your last visit? Include any pap smears or colon screening.  No     Surgeon : Dr. Alma Delia Conroy   Procedure : Teeth Extraction  Location : [place]  Surgery Date June 6,2018

## 2018-06-04 PROBLEM — C15.9 ADENOCARCINOMA OF ESOPHAGUS, STAGE 4 (HCC): Status: ACTIVE | Noted: 2018-06-04

## 2018-06-04 NOTE — PROGRESS NOTES
Preoperative Evaluation    Date of Exam: 2018    Rosalia Marley is a 57 y.o. male (:1955) who presents for preoperative evaluation. Procedure/Surgery:Oral Surgery  Date of Procedure/Surgery: 18  Surgeon: Dr Abdullahi Zuluaga [facility]  [facility]    Primary Physician: Lula Davis MD    HPI: Pt presents for medical clearance for oral surgery. Patient has a history of hypertension which is controlled on Zestoretic. Patient also has history of high cholesterol and high coronary artery calcium score. Patient has had normal cardiac stress testing for this and is on Lipitor 80 mg daily. 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 Patient is at low risk for this low risk surgery is okay to proceed without any further testing. Medical History:     Past Medical History:   Diagnosis Date    Adenocarcinoma of esophagus, stage 4 ([address] 2018    Agatston CAC score 200-399 2015    Coronary calcium score 331.  Essential hypertension, benign     History of tobacco use     quit 2016    Hoarseness of voice     Joint pain     Mediastinal mass     Pure hypercholesterolemia     Sleep apnea     CPAP    Tobacco dependency      Allergies: Allergies   Allergen Reactions    Iodine And Iodide Containing Products Nausea and Vomiting    Tetracycline Itching      Medications:     Current Outpatient Prescriptions   Medication Sig    lisinopril-hydroCHLOROthiazide (PRINZIDE, ZESTORETIC) 20-25 mg per tablet TAKE 1 TABLET BY MOUTH ONCE DAILY    cetirizine (ZYRTEC) 10 mg tablet Take 1 Tab by mouth daily.  atorvastatin (LIPITOR) 80 mg tablet TAKE 1 TABLET BY MOUTH DAILY    prochlorperazine (COMPAZINE) 10 mg tablet Take 10 mg by mouth.  ondansetron hcl (ZOFRAN) 8 mg tablet Take 8 mg by mouth.     chlorpheniramine-HYDROcodone (TUSSIONEX) 10-8 mg/5 mL suspension Take 5 mL by mouth every twelve (12) hours as needed for Cough. Max Daily Amount: 10 mL.  docusate sodium (COLACE) 100 mg capsule Take 100 mg by mouth daily.  oxyCODONE IR (ROXICODONE) 5 mg immediate release tablet Take 1-2 Tabs by mouth every four (4) hours as needed for Pain. Max Daily Amount: 60 mg. No current facility-administered medications for this visit. Surgical History:     Past Surgical History:   Procedure Laterality Date    HX OTHER SURGICAL Right 2011    Removal of shoulder bone spur     HX VASCULAR ACCESS      mediport left shoulder     Social History:     Social History     Social History    Marital status:      Spouse name: N/A    Number of children: N/A    Years of education: N/A     Social History Main Topics    Smoking status: Former Smoker     Packs/day: 1.00     Years: 20.00     Types: Cigarettes     Quit date: 1/2/2016    Smokeless tobacco: Never Used    Alcohol use Yes      Comment: occassionally/RARELY    Drug use: No    Sexual activity: Not Asked     Other Topics Concern    None     Social History Narrative       Recent use of: No recent use of aspirin (ASA), NSAIDS or steroids    Anesthesia Complications: None  History of abnormal bleeding : None      REVIEW OF SYSTEMS:  Respiratory: negative for dyspnea on exertion  Cardiovascular: negative for chest pain    EXAM:   Visit Vitals    /63 (BP 1 Location: Left arm, BP Patient Position: Sitting)    Pulse 81    Temp 98 °F (36.7 °C) (Oral)    Resp 20    Ht 5' 11\" (1.803 m)    Wt 246 lb (111.6 kg)    SpO2 98%    BMI 34.31 kg/m2     Chest - clear to auscultation, no wheezes, rales or rhonchi, symmetric air entry  Heart - normal rate, regular rhythm, normal S1, S2, no murmurs, rubs, clicks or gallops  Abdomen - soft, nontender, nondistended, no masses or organomegaly  Extremities - peripheral pulses normal, no pedal edema, no clubbing or cyanosis        IMPRESSION:           ICD-10-CM ICD-9-CM    1.  Essential hypertension, benign I10 401.1  well-controlled on current medications. Patient to take Zestoretic a.m. of surgery with sip of water. 2. Pure hypercholesterolemia E78.00 272.0  well-controlled on Lipitor. 3. Adenocarcinoma of esophagus, stage 4 (HCC) C15.9 150.9  patient undergoing treatment with chemotherapy and followed by oncology. Treatment currently on hold.    4. Tooth disorder K08.9 525.9  patient medically cleared for oral surgery     Monica Melendez MD   6/1/2018

## 2018-06-05 ENCOUNTER — ANESTHESIA EVENT (OUTPATIENT)
Dept: SURGERY | Age: 63
End: 2018-06-05
Payer: COMMERCIAL

## 2018-06-06 ENCOUNTER — HOSPITAL ENCOUNTER (OUTPATIENT)
Age: 63
Setting detail: OUTPATIENT SURGERY
Discharge: HOME | End: 2018-06-06
Attending: STUDENT IN AN ORGANIZED HEALTH CARE EDUCATION/TRAINING PROGRAM | Admitting: STUDENT IN AN ORGANIZED HEALTH CARE EDUCATION/TRAINING PROGRAM
Payer: COMMERCIAL

## 2018-06-06 ENCOUNTER — ANESTHESIA (OUTPATIENT)
Dept: SURGERY | Age: 63
End: 2018-06-06
Payer: COMMERCIAL

## 2018-06-06 VITALS
BODY MASS INDEX: 34.44 KG/M2 | HEIGHT: 71 IN | DIASTOLIC BLOOD PRESSURE: 80 MMHG | HEART RATE: 69 BPM | RESPIRATION RATE: 14 BRPM | SYSTOLIC BLOOD PRESSURE: 140 MMHG | TEMPERATURE: 96 F | OXYGEN SATURATION: 97 % | WEIGHT: 246 LBS

## 2018-06-06 LAB
ANION GAP SERPL CALC-SCNC: 9 MMOL/L (ref 3–18)
BUN SERPL-MCNC: 14 MG/DL (ref 7–18)
BUN/CREAT SERPL: 13 (ref 12–20)
CALCIUM SERPL-MCNC: 9.2 MG/DL (ref 8.5–10.1)
CHLORIDE SERPL-SCNC: 106 MMOL/L (ref 100–108)
CO2 SERPL-SCNC: 27 MMOL/L (ref 21–32)
CREAT SERPL-MCNC: 1.09 MG/DL (ref 0.6–1.3)
ERYTHROCYTE [DISTWIDTH] IN BLOOD BY AUTOMATED COUNT: 14.6 % (ref 11.6–14.5)
GLUCOSE SERPL-MCNC: 96 MG/DL (ref 74–99)
HCT VFR BLD AUTO: 35.6 % (ref 36–48)
HGB BLD-MCNC: 12 G/DL (ref 13–16)
MCH RBC QN AUTO: 30.5 PG (ref 24–34)
MCHC RBC AUTO-ENTMCNC: 33.7 G/DL (ref 31–37)
MCV RBC AUTO: 90.4 FL (ref 74–97)
PLATELET # BLD AUTO: 220 K/UL (ref 135–420)
PMV BLD AUTO: 9.8 FL (ref 9.2–11.8)
POTASSIUM SERPL-SCNC: 3.5 MMOL/L (ref 3.5–5.5)
RBC # BLD AUTO: 3.94 M/UL (ref 4.7–5.5)
SODIUM SERPL-SCNC: 142 MMOL/L (ref 136–145)
WBC # BLD AUTO: 5.7 K/UL (ref 4.6–13.2)

## 2018-06-06 PROCEDURE — 74011250637 HC RX REV CODE- 250/637: Performed by: NURSE ANESTHETIST, CERTIFIED REGISTERED

## 2018-06-06 PROCEDURE — 74011250636 HC RX REV CODE- 250/636

## 2018-06-06 PROCEDURE — 74011250636 HC RX REV CODE- 250/636: Performed by: NURSE ANESTHETIST, CERTIFIED REGISTERED

## 2018-06-06 PROCEDURE — 76210000006 HC OR PH I REC 0.5 TO 1 HR: Performed by: STUDENT IN AN ORGANIZED HEALTH CARE EDUCATION/TRAINING PROGRAM

## 2018-06-06 PROCEDURE — 74011000258 HC RX REV CODE- 258: Performed by: STUDENT IN AN ORGANIZED HEALTH CARE EDUCATION/TRAINING PROGRAM

## 2018-06-06 PROCEDURE — 80048 BASIC METABOLIC PNL TOTAL CA: CPT | Performed by: NURSE ANESTHETIST, CERTIFIED REGISTERED

## 2018-06-06 PROCEDURE — 76210000021 HC REC RM PH II 0.5 TO 1 HR: Performed by: STUDENT IN AN ORGANIZED HEALTH CARE EDUCATION/TRAINING PROGRAM

## 2018-06-06 PROCEDURE — 74011000250 HC RX REV CODE- 250

## 2018-06-06 PROCEDURE — 77030026438 HC STYL ET INTUB CARD -A: Performed by: NURSE ANESTHETIST, CERTIFIED REGISTERED

## 2018-06-06 PROCEDURE — 74011250637 HC RX REV CODE- 250/637

## 2018-06-06 PROCEDURE — 76060000034 HC ANESTHESIA 1.5 TO 2 HR: Performed by: STUDENT IN AN ORGANIZED HEALTH CARE EDUCATION/TRAINING PROGRAM

## 2018-06-06 PROCEDURE — 77030032490 HC SLV COMPR SCD KNE COVD -B: Performed by: STUDENT IN AN ORGANIZED HEALTH CARE EDUCATION/TRAINING PROGRAM

## 2018-06-06 PROCEDURE — 85027 COMPLETE CBC AUTOMATED: CPT | Performed by: NURSE ANESTHETIST, CERTIFIED REGISTERED

## 2018-06-06 PROCEDURE — 77030002888 HC SUT CHRMC J&J -A: Performed by: STUDENT IN AN ORGANIZED HEALTH CARE EDUCATION/TRAINING PROGRAM

## 2018-06-06 PROCEDURE — 77030019605: Performed by: STUDENT IN AN ORGANIZED HEALTH CARE EDUCATION/TRAINING PROGRAM

## 2018-06-06 PROCEDURE — 77030020782 HC GWN BAIR PAWS FLX 3M -B: Performed by: STUDENT IN AN ORGANIZED HEALTH CARE EDUCATION/TRAINING PROGRAM

## 2018-06-06 PROCEDURE — 74011000250 HC RX REV CODE- 250: Performed by: STUDENT IN AN ORGANIZED HEALTH CARE EDUCATION/TRAINING PROGRAM

## 2018-06-06 PROCEDURE — 77030008683 HC TU ET CUF COVD -A: Performed by: NURSE ANESTHETIST, CERTIFIED REGISTERED

## 2018-06-06 PROCEDURE — 74011000272 HC RX REV CODE- 272: Performed by: STUDENT IN AN ORGANIZED HEALTH CARE EDUCATION/TRAINING PROGRAM

## 2018-06-06 PROCEDURE — 77030012012 HC AIRWY OP SFT TELE -A: Performed by: NURSE ANESTHETIST, CERTIFIED REGISTERED

## 2018-06-06 PROCEDURE — 77030018836 HC SOL IRR NACL ICUM -A: Performed by: STUDENT IN AN ORGANIZED HEALTH CARE EDUCATION/TRAINING PROGRAM

## 2018-06-06 PROCEDURE — 76010000153 HC OR TIME 1.5 TO 2 HR: Performed by: STUDENT IN AN ORGANIZED HEALTH CARE EDUCATION/TRAINING PROGRAM

## 2018-06-06 PROCEDURE — 77030034967 HC GRFT DBM PLS PST ALLOFUS 3CC ALLO- D: Performed by: STUDENT IN AN ORGANIZED HEALTH CARE EDUCATION/TRAINING PROGRAM

## 2018-06-06 DEVICE — GRAFT BONE PASTE DEMINERLIZED BONE MTRX 3CC ALLOFUSE +: Type: IMPLANTABLE DEVICE | Site: MOUTH | Status: FUNCTIONAL

## 2018-06-06 RX ORDER — MIDAZOLAM HYDROCHLORIDE 1 MG/ML
INJECTION, SOLUTION INTRAMUSCULAR; INTRAVENOUS AS NEEDED
Status: DISCONTINUED | OUTPATIENT
Start: 2018-06-06 | End: 2018-06-06 | Stop reason: HOSPADM

## 2018-06-06 RX ORDER — SUCCINYLCHOLINE CHLORIDE 20 MG/ML
INJECTION INTRAMUSCULAR; INTRAVENOUS AS NEEDED
Status: DISCONTINUED | OUTPATIENT
Start: 2018-06-06 | End: 2018-06-06 | Stop reason: HOSPADM

## 2018-06-06 RX ORDER — ONDANSETRON 2 MG/ML
4 INJECTION INTRAMUSCULAR; INTRAVENOUS ONCE
Status: DISCONTINUED | OUTPATIENT
Start: 2018-06-06 | End: 2018-06-06 | Stop reason: HOSPADM

## 2018-06-06 RX ORDER — GLYCOPYRROLATE 0.2 MG/ML
INJECTION INTRAMUSCULAR; INTRAVENOUS AS NEEDED
Status: DISCONTINUED | OUTPATIENT
Start: 2018-06-06 | End: 2018-06-06 | Stop reason: HOSPADM

## 2018-06-06 RX ORDER — FENTANYL CITRATE 50 UG/ML
50 INJECTION, SOLUTION INTRAMUSCULAR; INTRAVENOUS
Status: DISCONTINUED | OUTPATIENT
Start: 2018-06-06 | End: 2018-06-06 | Stop reason: HOSPADM

## 2018-06-06 RX ORDER — FENTANYL CITRATE 50 UG/ML
INJECTION, SOLUTION INTRAMUSCULAR; INTRAVENOUS AS NEEDED
Status: DISCONTINUED | OUTPATIENT
Start: 2018-06-06 | End: 2018-06-06 | Stop reason: ALTCHOICE

## 2018-06-06 RX ORDER — DEXAMETHASONE SODIUM PHOSPHATE 4 MG/ML
INJECTION, SOLUTION INTRA-ARTICULAR; INTRALESIONAL; INTRAMUSCULAR; INTRAVENOUS; SOFT TISSUE AS NEEDED
Status: DISCONTINUED | OUTPATIENT
Start: 2018-06-06 | End: 2018-06-06 | Stop reason: HOSPADM

## 2018-06-06 RX ORDER — PHENYLEPHRINE HCL IN 0.9% NACL 1 MG/10 ML
SYRINGE (ML) INTRAVENOUS AS NEEDED
Status: DISCONTINUED | OUTPATIENT
Start: 2018-06-06 | End: 2018-06-06 | Stop reason: HOSPADM

## 2018-06-06 RX ORDER — LIDOCAINE HYDROCHLORIDE 20 MG/ML
INJECTION, SOLUTION EPIDURAL; INFILTRATION; INTRACAUDAL; PERINEURAL AS NEEDED
Status: DISCONTINUED | OUTPATIENT
Start: 2018-06-06 | End: 2018-06-06 | Stop reason: HOSPADM

## 2018-06-06 RX ORDER — BUPIVACAINE HYDROCHLORIDE AND EPINEPHRINE 2.5; 5 MG/ML; UG/ML
INJECTION, SOLUTION EPIDURAL; INFILTRATION; INTRACAUDAL; PERINEURAL AS NEEDED
Status: DISCONTINUED | OUTPATIENT
Start: 2018-06-06 | End: 2018-06-06 | Stop reason: HOSPADM

## 2018-06-06 RX ORDER — DEXTROSE 50 % IN WATER (D50W) INTRAVENOUS SYRINGE
25-50 AS NEEDED
Status: DISCONTINUED | OUTPATIENT
Start: 2018-06-06 | End: 2018-06-06 | Stop reason: HOSPADM

## 2018-06-06 RX ORDER — ROCURONIUM BROMIDE 10 MG/ML
INJECTION, SOLUTION INTRAVENOUS AS NEEDED
Status: DISCONTINUED | OUTPATIENT
Start: 2018-06-06 | End: 2018-06-06 | Stop reason: HOSPADM

## 2018-06-06 RX ORDER — INSULIN LISPRO 100 [IU]/ML
INJECTION, SOLUTION INTRAVENOUS; SUBCUTANEOUS ONCE
Status: DISCONTINUED | OUTPATIENT
Start: 2018-06-06 | End: 2018-06-06 | Stop reason: HOSPADM

## 2018-06-06 RX ORDER — MAGNESIUM SULFATE 100 %
4 CRYSTALS MISCELLANEOUS AS NEEDED
Status: DISCONTINUED | OUTPATIENT
Start: 2018-06-06 | End: 2018-06-06 | Stop reason: HOSPADM

## 2018-06-06 RX ORDER — SODIUM CHLORIDE, SODIUM LACTATE, POTASSIUM CHLORIDE, CALCIUM CHLORIDE 600; 310; 30; 20 MG/100ML; MG/100ML; MG/100ML; MG/100ML
50 INJECTION, SOLUTION INTRAVENOUS CONTINUOUS
Status: DISCONTINUED | OUTPATIENT
Start: 2018-06-06 | End: 2018-06-06 | Stop reason: HOSPADM

## 2018-06-06 RX ORDER — FAMOTIDINE 20 MG/1
20 TABLET, FILM COATED ORAL ONCE
Status: COMPLETED | OUTPATIENT
Start: 2018-06-06 | End: 2018-06-06

## 2018-06-06 RX ORDER — LIDOCAINE HYDROCHLORIDE 10 MG/ML
0.1 INJECTION, SOLUTION EPIDURAL; INFILTRATION; INTRACAUDAL; PERINEURAL AS NEEDED
Status: DISCONTINUED | OUTPATIENT
Start: 2018-06-06 | End: 2018-06-06 | Stop reason: HOSPADM

## 2018-06-06 RX ORDER — SODIUM CHLORIDE 0.9 % (FLUSH) 0.9 %
5-10 SYRINGE (ML) INJECTION AS NEEDED
Status: DISCONTINUED | OUTPATIENT
Start: 2018-06-06 | End: 2018-06-06 | Stop reason: HOSPADM

## 2018-06-06 RX ORDER — PROPOFOL 10 MG/ML
INJECTION, EMULSION INTRAVENOUS AS NEEDED
Status: DISCONTINUED | OUTPATIENT
Start: 2018-06-06 | End: 2018-06-06 | Stop reason: HOSPADM

## 2018-06-06 RX ORDER — SODIUM CHLORIDE 9 MG/ML
INJECTION, SOLUTION INTRAVENOUS
Status: DISCONTINUED | OUTPATIENT
Start: 2018-06-06 | End: 2018-06-06 | Stop reason: HOSPADM

## 2018-06-06 RX ADMIN — GLYCOPYRROLATE 0.2 MG: 0.2 INJECTION INTRAMUSCULAR; INTRAVENOUS at 13:13

## 2018-06-06 RX ADMIN — SODIUM CHLORIDE, SODIUM LACTATE, POTASSIUM CHLORIDE, AND CALCIUM CHLORIDE: 600; 310; 30; 20 INJECTION, SOLUTION INTRAVENOUS at 14:09

## 2018-06-06 RX ADMIN — Medication 100 MCG: at 14:30

## 2018-06-06 RX ADMIN — CLINDAMYCIN PHOSPHATE 900 MG: 150 INJECTION, SOLUTION, CONCENTRATE INTRAVENOUS at 13:13

## 2018-06-06 RX ADMIN — SODIUM CHLORIDE, SODIUM LACTATE, POTASSIUM CHLORIDE, AND CALCIUM CHLORIDE 50 ML/HR: 600; 310; 30; 20 INJECTION, SOLUTION INTRAVENOUS at 12:36

## 2018-06-06 RX ADMIN — PROPOFOL 100 MG: 10 INJECTION, EMULSION INTRAVENOUS at 13:27

## 2018-06-06 RX ADMIN — Medication 100 MCG: at 13:57

## 2018-06-06 RX ADMIN — GLYCOPYRROLATE 0.2 MG: 0.2 INJECTION INTRAMUSCULAR; INTRAVENOUS at 13:38

## 2018-06-06 RX ADMIN — SUCCINYLCHOLINE CHLORIDE 140 MG: 20 INJECTION INTRAMUSCULAR; INTRAVENOUS at 13:22

## 2018-06-06 RX ADMIN — LIDOCAINE HYDROCHLORIDE 60 MG: 20 INJECTION, SOLUTION EPIDURAL; INFILTRATION; INTRACAUDAL; PERINEURAL at 13:21

## 2018-06-06 RX ADMIN — FENTANYL CITRATE 100 MCG: 50 INJECTION, SOLUTION INTRAMUSCULAR; INTRAVENOUS at 13:21

## 2018-06-06 RX ADMIN — DEXAMETHASONE SODIUM PHOSPHATE 8 MG: 4 INJECTION, SOLUTION INTRA-ARTICULAR; INTRALESIONAL; INTRAMUSCULAR; INTRAVENOUS; SOFT TISSUE at 13:38

## 2018-06-06 RX ADMIN — FAMOTIDINE 20 MG: 20 TABLET ORAL at 12:36

## 2018-06-06 RX ADMIN — PROPOFOL 200 MG: 10 INJECTION, EMULSION INTRAVENOUS at 13:21

## 2018-06-06 RX ADMIN — Medication 100 MCG: at 13:45

## 2018-06-06 RX ADMIN — ROCURONIUM BROMIDE 5 MG: 10 INJECTION, SOLUTION INTRAVENOUS at 13:21

## 2018-06-06 RX ADMIN — MIDAZOLAM HYDROCHLORIDE 2 MG: 1 INJECTION, SOLUTION INTRAMUSCULAR; INTRAVENOUS at 13:12

## 2018-06-06 NOTE — ANESTHESIA PREPROCEDURE EVALUATION
Anesthetic History   No history of anesthetic complications            Review of Systems / Medical History  Patient summary reviewed and pertinent labs reviewed    Pulmonary        Sleep apnea: CPAP  Smoker         Neuro/Psych             Comments: neuropathy Cardiovascular    Hypertension          Hyperlipidemia    Exercise tolerance: >4 METS     GI/Hepatic/Renal  Within defined limits              Endo/Other        Obesity and cancer     Other Findings   Comments: Documentation of current medication  Current medications obtained, documented and obtained? YES      Risk Factors for Postoperative nausea/vomiting:       History of postoperative nausea/vomiting? NO       Female? NO       Motion sickness? NO       Intended opioid administration for postoperative analgesia? YES      Smoking Abstinence:  Current Smoker? YES  Elective Surgery? YES  Seen preoperatively by anesthesiologist or proxy prior to day of surgery? YES  Pt abstained from smoking 24 hours prior to anesthesia?  YES    Preventive care/screening for High Blood Pressure:  Aged 18 years and older: YES  Screened for high blood pressure: YES  Patients with high blood pressure referred to primary care provider   for BP management: YES                 Physical Exam    Airway  Mallampati: II  TM Distance: > 6 cm  Neck ROM: normal range of motion   Mouth opening: Normal     Cardiovascular  Regular rate and rhythm,  S1 and S2 normal,  no murmur, click, rub, or gallop             Dental    Dentition: Poor dentition     Pulmonary  Breath sounds clear to auscultation               Abdominal  GI exam deferred       Other Findings            Anesthetic Plan    ASA: 3  Anesthesia type: general          Induction: Intravenous  Anesthetic plan and risks discussed with: Patient

## 2018-06-06 NOTE — ANESTHESIA POSTPROCEDURE EVALUATION
Post-Anesthesia Evaluation and Assessment    Patient: Mirian Lazar MRN: [id number]  SSN: [id number]    YOB: 1955  Age: 57 y.o. Sex: male       Cardiovascular Function/Vital Signs  Visit Vitals    /79    Pulse 76    Temp 36 °C (96.8 °F)    Resp 16    Ht 5' 11\" (1.803 m)    Wt 111.6 kg (246 lb)    SpO2 100%    BMI 34.31 kg/m2       Patient is status post general anesthesia for Procedure(s):  INCISION AND DRAINAGE/SINUS CLOSURE  EXTRACTION OF TEETH. Nausea/Vomiting: None    Postoperative hydration reviewed and adequate. Pain:  Pain Scale 1: Numeric (0 - 10) (06/06/18 1515)  Pain Intensity 1: 0 (06/06/18 1515)   Managed    Neurological Status:   Neuro (WDL): Within Defined Limits (06/06/18 1515)   At baseline    Mental Status and Level of Consciousness: Arousable    Pulmonary Status:   O2 Device: Oxygen mask (06/06/18 1515)   Adequate oxygenation and airway patent    Complications related to anesthesia: None    Post-anesthesia assessment completed.  No concerns      Signed By: Dominick Rivera MD     June 6, 2018

## 2018-06-06 NOTE — BRIEF OP NOTE
BRIEF OPERATIVE NOTE    Date of Procedure: 6/6/2018   Preoperative Diagnosis: K12.2, J34.89, K08.22, C15.9, G47.33  Postoperative Diagnosis: K12.2, J34.89, K08.22, C15.9, G47.33    Procedure(s):  INCISION AND DRAINAGE/SINUS CLOSURE  EXTRACTION OF TEETH  BONE GRAFT SOCKETS #3,19  Surgeon(s) and Role:     * Alvaro Pascual DDS - Primary         Surgical Assistant: Sergey Burgos    Surgical Staff:  Circ-1: Noemí Green RN  Circ-Relief: Mackenzie Orantes  Scrub Tech-1: Zurdo Browne  Scrub Tech-2: Jyoti Kilgore  Surg Asst-1: Adonay Numbers  Event Time In   Incision Start 1343   Incision Close 1500     Anesthesia: General   Estimated Blood Loss: 5cc  Specimens: * No specimens in log *   Findings: gross decay/fx teeth #9,82,96  Complications: none  Implants:   Implant Name Type Inv.  Item Serial No.  Lot No. LRB No. Used Action   GRAFT DBM PLUS PASTE 3ML -- ALLOFUSE - [id number]  GRAFT DBM PLUS PASTE 3ML -- ALLOFUSE  [employer] [id number] N/A 1 Implanted   Collagen Plug       [employer] [id number] N/A 1 Implanted

## 2018-06-06 NOTE — IP AVS SNAPSHOT
[address] [address] 
[phone] Patient: Velia Jonas MRN: [id number] :1955 About your hospitalization You were admitted on:  2018 You last received care in the:  [facility] PAC[facility] You were discharged on:  2018 Why you were hospitalized Your primary diagnosis was:  Not on File Follow-up Information Follow up With Details Comments Contact Info Vee Agudelo MD   [address] [phone] Hubert Martin DDS  Please call [phone] to schedule a follow up appointment in one week.  [facility] [address] [address] 
[phone] Discharge Orders None A check morris indicates which time of day the medication should be taken. My Medications ASK your doctor about these medications Instructions Each Dose to Equal  
 Morning Noon Evening Bedtime  
 atorvastatin 80 mg tablet Commonly known as:  LIPITOR Your last dose was: Your next dose is: TAKE 1 TABLET BY MOUTH DAILY  
     
   
   
   
  
 cetirizine 10 mg tablet Commonly known as:  ZYRTEC Your last dose was: Your next dose is: Take 1 Tab by mouth daily. 10 mg  
    
   
   
   
  
 chlorpheniramine-HYDROcodone 10-8 mg/5 mL suspension Commonly known as:  Ova Polka Your last dose was: Your next dose is: Take 5 mL by mouth every twelve (12) hours as needed for Cough. Max Daily Amount: 10 mL. 1 tsp COLACE 100 mg capsule Generic drug:  docusate sodium Your last dose was: Your next dose is: Take 100 mg by mouth daily. 100 mg  
    
   
   
   
  
 COMPAZINE 10 mg tablet Generic drug:  prochlorperazine Your last dose was: Your next dose is: Take 10 mg by mouth.   
 10 mg  
    
   
   
   
  
 lisinopril-hydroCHLOROthiazide 20-25 mg per tablet Commonly known as:  Jiejessee Stephens Your last dose was: Your next dose is: TAKE 1 TABLET BY MOUTH ONCE DAILY  
     
   
   
   
  
 ondansetron hcl 8 mg tablet Commonly known as:  Song Bartlett Your last dose was: Your next dose is: Take 8 mg by mouth. 8 mg  
    
   
   
   
  
 oxyCODONE IR 5 mg immediate release tablet Commonly known as:  Bladimir Badillo Your last dose was: Your next dose is: Take 1-2 Tabs by mouth every four (4) hours as needed for Pain. Max Daily Amount: 60 mg.  
 5-10 mg Opioid Education Prescription Opioids: What You Need to Know: 
 
Prescription opioids can be used to help relieve moderate-to-severe pain and are often prescribed following a surgery or injury, or for certain health conditions. These medications can be an important part of treatment but also come with serious risks. Opioids are strong pain medicines. Examples include hydrocodone, oxycodone, fentanyl, and morphine. Heroin is an example of an illegal opioid. It is important to work with your health care provider to make sure you are getting the safest, most effective care. WHAT ARE THE RISKS AND SIDE EFFECTS OF OPIOID USE? Prescription opioids carry serious risks of addiction and overdose, especially with prolonged use. An opioid overdose, often marked by slow breathing, can cause sudden death. The use of prescription opioids can have a number of side effects as well, even when taken as directed. · Tolerance-meaning you might need to take more of a medication for the same pain relief · Physical dependence-meaning you have symptoms of withdrawal when the medication is stopped. Withdrawal symptoms can include nausea, sweating, chills, diarrhea, stomach cramps, and muscle aches.   Withdrawal can last up to several weeks, depending on which drug you took and how long you took it. · Increased sensitivity to pain · Constipation · Nausea, vomiting, and dry mouth · Sleepiness and dizziness · Confusion · Depression · Low levels of testosterone that can result in lower sex drive, energy, and strength · Itching and sweating RISKS ARE GREATER WITH:      
· History of drug misuse, substance use disorder, or overdose · Mental health conditions (such as depression or anxiety) · Sleep apnea · Older age (72 years or older) · Pregnancy Avoid alcohol while taking prescription opioids. Also, unless specifically advised by your health care provider, medications to avoid include: · Benzodiazepines (such as Xanax or Valium) · Muscle relaxants (such as Soma or Flexeril) · Hypnotics (such as Ambien or Lunesta) · Other prescription opioids KNOW YOUR OPTIONS Talk to your health care provider about ways to manage your pain that don't involve prescription opioids. Some of these options may actually work better and have fewer risks and side effects. Options may include: 
· Pain relievers such as acetaminophen, ibuprofen, and naproxen · Some medications that are also used for depression or seizures · Physical therapy and exercise · Counseling to help patients learn how to cope better with triggers of pain and stress. · Application of heat or cold compress · Massage therapy · Relaxation techniques Be Informed Make sure you know the name of your medication, how much and how often to take it, and its potential risks & side effects. IF YOU ARE PRESCRIBED OPIOIDS FOR PAIN: 
· Never take opioids in greater amounts or more often than prescribed. Remember the goal is not to be pain-free but to manage your pain at a tolerable level. · Follow up with your primary care provider to: · Work together to create a plan on how to manage your pain. · Talk about ways to help manage your pain that don't involve prescription opioids. · Talk about any and all concerns and side effects. · Help prevent misuse and abuse. · Never sell or share prescription opioids · Help prevent misuse and abuse. · Store prescription opioids in a secure place and out of reach of others (this may include visitors, children, friends, and family). · Safely dispose of unused/unwanted prescription opioids: Find your community drug take-back program or your pharmacy mail-back program, or flush them down the toilet, following guidance from the Food and Drug Administration (www.fda.gov/Drugs/ResourcesForYou). · Visit www.cdc.gov/drugoverdose to learn about the risks of opioid abuse and overdose. · If you believe you may be struggling with addiction, tell your health care provider and ask for guidance or call [facility] at 4-830-536-BPCO. Discharge Instructions Dental Surgery: What to Expect at [facility] Your Recovery Dental surgery includes procedures such as tooth extractions, root canals, gum surgery, and dental implants. Your procedure may be done by: · A dentist. 
· An oral surgeon. · An endodontist, for root canals. · A periodontist, for gum surgery. You may have some pain, bleeding, or swelling afterward, depending on the procedure. You may get medicine for pain. The pain should improve steadily after the surgery. This care sheet gives you a general idea about how long it will take for you to recover. But each person recovers at a different pace. Follow the steps below to get better as quickly as possible. How can you care for yourself at home? Activity ? · Allow the area to heal. Don't move quickly or lift anything heavy until you are feeling better. ? · Rest when you feel tired.   
? · Your dentist may give you specific instructions on when you can do your normal activities again, such as driving and going back to work. Diet ? · Eat soft foods, such as gelatin, pudding, or a thin soup. Gradually add solid foods to your diet as you heal. You can eat solid foods again in about a week. ? · If you had a tooth pulled, don't use a straw for the first few days. Sucking on a straw can loosen the blood clot that forms at the surgery site. If this happens, it can delay healing. Medicines ? · Your doctor will tell you if and when you can restart your medicines. He or she will also give you instructions about taking any new medicines. ? · If you take blood thinners, such as warfarin (Coumadin), clopidogrel (Plavix), or aspirin, be sure to talk to your doctor. He or she will tell you if and when to start taking those medicines again. Make sure that you understand exactly what your doctor wants you to do. ? · Be safe with medicines. Read and follow all instructions on the label. ¨ If the dentist gave you a prescription medicine for pain, take it as prescribed. ¨ If you are not taking a prescription pain medicine, ask your dentist if you can take an over-the-counter medicine. ? · If your dentist prescribed antibiotics, take them as directed. Do not stop taking them just because you feel better. You need to take the full course of antibiotics. Incision care ? · While your mouth is numb, be careful not to bite your tongue or the inside of your cheek or lip. ? · If you had a tooth pulled, bite gently on a gauze pad now and then. Change the pad as it becomes soaked with blood. Call your dentist or oral surgeon if you still have bleeding 24 hours after your surgery. ? · If you had stitches in your gums, your dentist will tell you if and when you need to come back to have them removed. ? · Starting 24 hours after your tooth was pulled, gently rinse your mouth with warm salt water several times a day to reduce swelling and relieve pain. ? · Continue to brush your teeth and tongue carefully. Floss when your dentist says you can. ?Ice and heat ? · If needed, put ice or a cold pack on your cheek for 10 to 20 minutes at a time. Try to do this every 1 to 2 hours for the next 3 days (when you are awake) or until the swelling goes down. Put a thin cloth between the ice and your skin. Other instructions ? · Do not smoke for at least 24 hours after your surgery. Smoking can delay healing. Smoking also decreases the blood supply and can bring germs and contaminants to the mouth. Follow-up care is a key part of your treatment and safety. Be sure to make and go to all appointments, and call your dentist if you are having problems. It's also a good idea to know your test results and keep a list of the medicines you take. When should you call for help? Call 911 anytime you think you may need emergency care. For example, call if: 
? · You passed out (lost consciousness). ? · You have severe trouble breathing. ?Call your dentist now or seek immediate medical care if: 
? · You have pain that does not get better after you take pain medicine. ? · You have loose stitches, or your incision comes open. ? · You have new or more bleeding from the site. ? · You have signs of infection, such as: 
¨ Increased pain, swelling, warmth, or redness. ¨ Red streaks leading from the area. ¨ Pus draining from the area. ¨ A fever. ? Watch closely for changes in your health, and be sure to contact your dentist if you have any problems. Where can you learn more? Go to http://tristan-marina.info/. Enter R315 in the search box to learn more about \"Dental Surgery: What to Expect at Home. \" Current as of: May 12, 2017 Content Version: 11.4 © 6288-9075 Healthwise[employer].  Care instructions adapted under license by [employer] (which disclaims liability or warranty for this information). If you have questions about a medical condition or this instruction, always ask your healthcare professional. [address] any warranty or liability for your use of this information. DISCHARGE SUMMARY from Nurse PATIENT INSTRUCTIONS: 
 
After general anesthesia or intravenous sedation, for 24 hours or while taking prescription Narcotics: · Limit your activities · Do not drive and operate hazardous machinery · Do not make important personal or business decisions · Do  not drink alcoholic beverages · If you have not urinated within 8 hours after discharge, please contact your surgeon on call. *  Please give a list of your current medications to your Primary Care Provider. *  Please update this list whenever your medications are discontinued, doses are 
    changed, or new medications (including over-the-counter products) are added. *  Please carry medication information at all times in case of emergency situations. These are general instructions for a healthy lifestyle: No smoking/ No tobacco products/ Avoid exposure to second hand smoke Surgeon General's Warning:  Quitting smoking now greatly reduces serious risk to your health. Obesity, smoking, and sedentary lifestyle greatly increases your risk for illness A healthy diet, regular physical exercise & weight monitoring are important for maintaining a healthy lifestyle You may be retaining fluid if you have a history of heart failure or if you experience any of the following symptoms:  Weight gain of 3 pounds or more overnight or 5 pounds in a week, increased swelling in our hands or feet or shortness of breath while lying flat in bed. Please call your doctor as soon as you notice any of these symptoms; do not wait until your next office visit. Recognize signs and symptoms of STROKE: 
 
F-face looks uneven A-arms unable to move or move unevenly S-speech slurred or non-existent T-time-call 911 as soon as signs and symptoms begin-DO NOT go Back to bed or wait to see if you get better-TIME IS BRAIN. Warning Signs of HEART ATTACK Call 911 if you have these symptoms: 
? Chest discomfort. Most heart attacks involve discomfort in the center of the chest that lasts more than a few minutes, or that goes away and comes back. It can feel like uncomfortable pressure, squeezing, fullness, or pain. ? Discomfort in other areas of the upper body. Symptoms can include pain or discomfort in one or both arms, the back, neck, jaw, or stomach. ? Shortness of breath with or without chest discomfort. ? Other signs may include breaking out in a cold sweat, nausea, or lightheadedness. Don't wait more than five minutes to call [address]! Fast action can save your life. Calling 911 is almost always the fastest way to get lifesaving treatment. Emergency Medical Services staff can begin treatment when they arrive  up to an hour sooner than if someone gets to the hospital by car. The discharge information has been reviewed with the patient and spouse. The patient and spouse verbalized understanding. Discharge medications reviewed with the patient and spouse and appropriate educational materials and side effects teaching were provided. ___________________________________________________________________________________________________________________________________ Introducing [facility]! Dear Harish Generous: Thank you for requesting a PermissionTV account. Our records indicate that you already have an active PermissionTV account. You can access your account anytime at [email or web address] Did you know that you can access your hospital and ER discharge instructions at any time in PermissionTV? You can also review all of your test results from your hospital stay or ER visit. Additional Information If you have questions, please visit the Frequently Asked Questions section of the MyChart website at [email or web address] Remember, Queue-itt is NOT to be used for urgent needs. For medical emergencies, dial 911. Now available from your iPhone and Android! Introducing William Lainez As a [facility] patient, I wanted to make you aware of our electronic visit tool called William Lainez. [facility] 24/7 allows you to connect within minutes with a medical provider 24 hours a day, seven days a week via a mobile device or tablet or logging into a secure website from your computer. You can access William Lainez from anywhere in the [place]. A virtual visit might be right for you when you have a simple condition and feel like you just dont want to get out of bed, or cant get away from work for an appointment, when your regular [facility] provider is not available (evenings, weekends or holidays), or when youre out of town and need minor care. Electronic visits cost only $49 and if the [facility] 24/7 provider determines a prescription is needed to treat your condition, one can be electronically transmitted to a nearby pharmacy*. Please take a moment to enroll today if you have not already done so. The enrollment process is free and takes just a few minutes. To enroll, please download the Gnzodra [employer] Plus/Indie Vinos savannah to your tablet or phone, or visit [email or web address] to enroll on your computer. And, as an [address] [place] [address] patient with a [email or web address] account, the results of your visits will be scanned into your electronic medical record and your primary care provider will be able to view the scanned results. We urge you to continue to see your regular [facility] provider for your ongoing medical care.   And while your primary care provider may not be the one available when you seek a William Lainez virtual visit, the peace of mind you get from getting a real diagnosis real time can be priceless. For more information on William Lainez, view our Frequently Asked Questions (FAQs) at [email or web address]. Sincerely, 
 
Mayte Mclain MD 
Chief Medical Officer [address] *:  certain medications cannot be prescribed via William Lainez Providers Seen During Your Hospitalization Provider Specialty Primary office phone Kiran Lawler, [address] [facility] Oral Surgery [phone] Your Primary Care Physician (PCP) Primary Care Physician Office Phone Office Fax [address] [place] [address] [phone] You are allergic to the following Allergen Reactions Iodine And Iodide Containing Products Nausea and Vomiting Tetracycline Itching Recent Documentation Height Weight BMI Smoking Status 1.803 m 111.6 kg 34.31 kg/m2 Former Smoker Emergency Contacts Name Discharge Info Relation Home Work Mobile [facility] DISCHARGE CAREGIVER [3] Spouse [3] [phone] Davey Ochoa  Daughter [21] [phone] Alma Lubin  Daughter [21] [phone] Patient Belongings The following personal items are in your possession at time of discharge: 
  Dental Appliances: None  Visual Aid: Glasses      Home Medications: None   Jewelry: Ring, Watch  Clothing: Pants, Shirt, Undergarments, Socks, Footwear    Other Valuables: Eyeglasses, Cell Phone Please provide this summary of care documentation to your next provider. Signatures-by signing, you are acknowledging that this After Visit Summary has been reviewed with you and you have received a copy. Patient Signature:  ____________________________________________________________ Date:  ____________________________________________________________  
  
Rebeca Longo  Provider Signature: ____________________________________________________________ Date:  ____________________________________________________________

## 2018-06-06 NOTE — PROGRESS NOTES
56yo AAM presents for eval/ext teeth #3,15,19, possible sinus closure UL, bone graft placement #3,19 in preparation for implant placement in the future.  No changes in med hx since pt saw PCP for H and P.    Dr Funmi Escalante

## 2018-06-06 NOTE — DISCHARGE INSTRUCTIONS
Dental Surgery: What to Expect at [address] [place] [address] surgery includes procedures such as tooth extractions, root canals, gum surgery, and dental implants. Your procedure may be done by:  · A dentist.  · An oral surgeon. · An endodontist, for root canals. · A periodontist, for gum surgery. You may have some pain, bleeding, or swelling afterward, depending on the procedure. You may get medicine for pain. The pain should improve steadily after the surgery. This care sheet gives you a general idea about how long it will take for you to recover. But each person recovers at a different pace. Follow the steps below to get better as quickly as possible. How can you care for yourself at home? Activity  ? · Allow the area to heal. Don't move quickly or lift anything heavy until you are feeling better. ? · Rest when you feel tired. ? · Your dentist may give you specific instructions on when you can do your normal activities again, such as driving and going back to work. Diet  ? · Eat soft foods, such as gelatin, pudding, or a thin soup. Gradually add solid foods to your diet as you heal. You can eat solid foods again in about a week. ? · If you had a tooth pulled, don't use a straw for the first few days. Sucking on a straw can loosen the blood clot that forms at the surgery site. If this happens, it can delay healing. Medicines  ? · Your doctor will tell you if and when you can restart your medicines. He or she will also give you instructions about taking any new medicines. ? · If you take blood thinners, such as warfarin (Coumadin), clopidogrel (Plavix), or aspirin, be sure to talk to your doctor. He or she will tell you if and when to start taking those medicines again. Make sure that you understand exactly what your doctor wants you to do. ? · Be safe with medicines. Read and follow all instructions on the label.   ¨ If the dentist gave you a prescription medicine for pain, take it as prescribed. ¨ If you are not taking a prescription pain medicine, ask your dentist if you can take an over-the-counter medicine. ? · If your dentist prescribed antibiotics, take them as directed. Do not stop taking them just because you feel better. You need to take the full course of antibiotics. Incision care  ? · While your mouth is numb, be careful not to bite your tongue or the inside of your cheek or lip. ? · If you had a tooth pulled, bite gently on a gauze pad now and then. Change the pad as it becomes soaked with blood. Call your dentist or oral surgeon if you still have bleeding 24 hours after your surgery. ? · If you had stitches in your gums, your dentist will tell you if and when you need to come back to have them removed. ? · Starting 24 hours after your tooth was pulled, gently rinse your mouth with warm salt water several times a day to reduce swelling and relieve pain. ? · Continue to brush your teeth and tongue carefully. Floss when your dentist says you can. ?Ice and heat  ? · If needed, put ice or a cold pack on your cheek for 10 to 20 minutes at a time. Try to do this every 1 to 2 hours for the next 3 days (when you are awake) or until the swelling goes down. Put a thin cloth between the ice and your skin. Other instructions  ? · Do not smoke for at least 24 hours after your surgery. Smoking can delay healing. Smoking also decreases the blood supply and can bring germs and contaminants to the mouth. Follow-up care is a key part of your treatment and safety. Be sure to make and go to all appointments, and call your dentist if you are having problems. It's also a good idea to know your test results and keep a list of the medicines you take. When should you call for help? Call 911 anytime you think you may need emergency care. For example, call if:  ? · You passed out (lost consciousness). ? · You have severe trouble breathing.    ?Call your dentist now or seek immediate medical care if:  ? · You have pain that does not get better after you take pain medicine. ? · You have loose stitches, or your incision comes open. ? · You have new or more bleeding from the site. ? · You have signs of infection, such as:  ¨ Increased pain, swelling, warmth, or redness. ¨ Red streaks leading from the area. ¨ Pus draining from the area. ¨ A fever. ? Watch closely for changes in your health, and be sure to contact your dentist if you have any problems. Where can you learn more? Go to http://tristan-marina.info/. Enter E794 in the search box to learn more about \"Dental Surgery: What to Expect at Home. \"  Current as of: May 12, 2017  Content Version: 11.4  © 2515-8426 [employer]. Care instructions adapted under license by [employer] (which disclaims liability or warranty for this information). If you have questions about a medical condition or this instruction, always ask your healthcare professional. [place] [address] any warranty or liability for your use of this information. DISCHARGE SUMMARY from Nurse    PATIENT INSTRUCTIONS:    After general anesthesia or intravenous sedation, for 24 hours or while taking prescription Narcotics:  · Limit your activities  · Do not drive and operate hazardous machinery  · Do not make important personal or business decisions  · Do  not drink alcoholic beverages  · If you have not urinated within 8 hours after discharge, please contact your surgeon on call. *  Please give a list of your current medications to your Primary Care Provider. *  Please update this list whenever your medications are discontinued, doses are      changed, or new medications (including over-the-counter products) are added. *  Please carry medication information at all times in case of emergency situations.     These are general instructions for a healthy lifestyle:    No smoking/ No tobacco products/ Avoid exposure to second hand smoke  Surgeon General's Warning:  Quitting smoking now greatly reduces serious risk to your health. Obesity, smoking, and sedentary lifestyle greatly increases your risk for illness    A healthy diet, regular physical exercise & weight monitoring are important for maintaining a healthy lifestyle    You may be retaining fluid if you have a history of heart failure or if you experience any of the following symptoms:  Weight gain of 3 pounds or more overnight or 5 pounds in a week, increased swelling in our hands or feet or shortness of breath while lying flat in bed. Please call your doctor as soon as you notice any of these symptoms; do not wait until your next office visit. Recognize signs and symptoms of STROKE:    F-face looks uneven    A-arms unable to move or move unevenly    S-speech slurred or non-existent    T-time-call 911 as soon as signs and symptoms begin-DO NOT go       Back to bed or wait to see if you get better-TIME IS BRAIN. Warning Signs of HEART ATTACK     Call 911 if you have these symptoms:   Chest discomfort. Most heart attacks involve discomfort in the center of the chest that lasts more than a few minutes, or that goes away and comes back. It can feel like uncomfortable pressure, squeezing, fullness, or pain.  Discomfort in other areas of the upper body. Symptoms can include pain or discomfort in one or both arms, the back, neck, jaw, or stomach.  Shortness of breath with or without chest discomfort.  Other signs may include breaking out in a cold sweat, nausea, or lightheadedness. Don't wait more than five minutes to call 911 - MINUTES MATTER! Fast action can save your life. Calling 911 is almost always the fastest way to get lifesaving treatment. Emergency Medical Services staff can begin treatment when they arrive -- up to an hour sooner than if someone gets to the hospital by car.      The discharge information has been reviewed with the patient and spouse. The patient and spouse verbalized understanding. Discharge medications reviewed with the patient and spouse and appropriate educational materials and side effects teaching were provided.   ___________________________________________________________________________________________________________________________________

## 2018-06-07 NOTE — OP NOTES
[address] [place] [address]   OPERATIVE REPORT    Name:Violeta BARNES  MR#: [id number]  : 1955  ACCOUNT #: [de-identified]   DATE OF SERVICE: 2018 approximately 1:15 p.m. PREOPERATIVE DIAGNOSIS:  A 49-year-old -American male with a history of stage IV esophageal cancer undergoing chemotherapy. Also, a history of hypertension, coronary artery disease, obstructive sleep apnea and occasional chest pain on exertion. The patient also had grossly decayed teeth #3, 15 and 19 with infection around tooth #15 in the left maxillary vestibule. POSTOPERATIVE DIAGNOSIS:   A 49-year-old -American male with a history of stage IV esophageal cancer undergoing chemotherapy. Also, a history of hypertension, coronary artery disease, obstructive sleep apnea and occasional chest pain on exertion. The patient also had grossly decayed teeth #3, 15 and 19 with infection around tooth #15 in the left maxillary vestibule. PROCEDURE PERFORMED:  Extraction teeth #3,15,19. Incision and drainage maxillary left buccal vestibule. Bone graft placement sockets #3,19    SURGEON:  Frandy Benitez MD    ASSISTANT:  Robert     ESTIMATED BLOOD LOSS:  Blood loss during the case was approximately 5 mL. ANESTHESIA:  General with a nasal intubation. SPECIMENS REMOVED:  Teeth     COMPLICATIONS:  none    IMPLANTS:  none    PROCEDURE IN DETAIL:  The patient was in the preoperative area. I discussed the risks, benefits and alternatives to treatment. All questions were answered and the patient signed this consent prior to being brought back to the operating room. The patient was brought back to the operating room by the anesthesia team where he was placed in a supine position on the operating room table. He was then nasally intubated without complications by the anesthesia team.  At this time, a bite block was placed and a moistened Ray-Estephania was placed in the throat for a throat screen.   The patient was then anesthetized with approximately 10 mL of 1% Xylocaine with 1:100,000 epinephrine in a left inferior alveolar nerve block and left and right PSA blocks as well as other maxillary and mandibular infiltrations. At this time, the patient was prepped and draped in standard fashion for an oral and maxillofacial surgery procedure. Attention was first paid to the area of #15. The patient had a bridge which connected teeth #14 and 15. This was sectioned with a series of fissure burs without complications. Tooth #15 was then surgically extracted without complication being careful to preserve the buccal plate. The area was curetted and irrigated and the bone was smoothed with a file and rongeur as well as a large round bur. The area then had Gelfoam placed in the socket and was closed with 3-0 chromic suture. Attention was then paid to tooth #19, which was also surgically extracted without complication. The socket was curetted. The bone was smoothed with a file and rongeur. The buccal plate was noted to be intact after removing the tooth. A bone graft was then placed with demineralized bone in the area of 19 socket. The socket was filled up to the top of the socket and a Colla plug was placed over the socket opening. The area was then closed with 3-0 chromic suture. Lastly, attention was paid to tooth #3. Tooth #3 was also part of a bridge. The bridge was sectioned between tooth #2 and #3 without complication and the pontic and the crown on tooth #3 were then removed. Tooth #3 was then surgically extracted by sectioning the tooth in a Y fashion and delivering the roots. The socket was then curetted. The bone was smoothed with a file rongeur and large round bur. The area was then irrigated. The same bone graft material was placed in the socket of #3 and a Colla plug was then placed. The area was then sutured with 3-0 chromic suture with a 3-0 figure-of-eight suture over the socket of #3.   At this time, the patient was anesthetized with approximately 6 mL of 0.25% Marcaine with 1:200,000 epinephrine in the same areas anesthetized previously with the 1% Xylocaine. At this time, the Ray-Estephania was removed from the throat. The oral and nasopharynx was suctioned thoroughly. All instruments, lap pads, gauzes and equipment were accounted for at the end of the surgery. Again, there was approximately 5 mL of blood loss. The patient was then extubated in the operating room without complication and taken to the recovery room where he did well.       MD JUVENAL Mujica / ROBERT  D: 06/07/2018 07:47     T: 06/07/2018 14:14  JOB #: [id number]

## 2018-09-17 NOTE — PATIENT INSTRUCTIONS
Hyperlipidemia: After Your Visit  Your Care Instructions  Hyperlipidemia is too much fat in your blood. The body has several kinds of fat, including cholesterol and triglycerides. Your body needs fat for many things, such as making new cells. But too much fat in your blood increases your chances of having a heart attack or stroke. You may be able to lower your cholesterol and triglycerides with a heart-healthy diet, exercise, and if needed, medicine. Your doctor may want you to try lifestyle changes first to see whether they lower the fat in your blood. You may need to take medicine if lifestyle changes do not lower the fat in your blood enough. Follow-up care is a key part of your treatment and safety. Be sure to make and go to all appointments, and call your doctor if you are having problems. Its also a good idea to know your test results and keep a list of the medicines you take. How can you care for yourself at home? Take your medicines  · Take your medicines exactly as prescribed. Call your doctor if you think you are having a problem with your medicine. · If you take medicine to lower your cholesterol, go to follow-up visits. You will need to have blood tests. · Do not take large doses of niacin, which is a B vitamin, while taking medicine called statins. It may increase the chance of muscle pain and liver problems. · Talk to your doctor about avoiding grapefruit juice if you are taking statins. Grapefruit juice can raise the level of this medicine in your blood. This could increase side effects. Eat more fruits, vegetables, and fiber  · Fruits and vegetables have lots of nutrients that help protect against heart disease, and they have little--if any--fat. Try to eat at least five servings a day. Dark green, deep orange, or yellow fruits and vegetables are healthy choices. · Keep carrots, celery, and other veggies handy for snacks.  Buy fruit that is in season and store it where you can see it so that you will be tempted to eat it. Cook dishes that have a lot of veggies in them, such as stir-fries and soups. · Foods high in fiber may reduce your cholesterol and provide important vitamins and minerals. High-fiber foods include whole-grain cereals and breads, oatmeal, beans, brown rice, citrus fruits, and apples. · Buy whole-grain breads and cereals instead of white bread and pastries. Limit saturated fat  · Read food labels and try to avoid saturated fat and trans fat. They increase your risk of heart disease. · Use olive or canola oil when you cook. Try cholesterol-lowering spreads, such as Benecol or Take Control. · Bake, broil, grill, or steam foods instead of frying them. · Limit the amount of high-fat meats you eat, including hot dogs and sausages. Cut out all visible fat when you prepare meat. · Eat fish, skinless poultry, and soy products such as tofu instead of high-fat meats. Soybeans may be especially good for your heart. Eat at least two servings of fish a week. Certain fish, such as salmon, contain omega-3 fatty acids, which may help reduce your risk of heart attack. · Choose low-fat or fat-free milk and dairy products. Get exercise, limit alcohol, and quit smoking  · Get more exercise. Work with your doctor to set up an exercise program. Even if you can do only a small amount, exercise will help you get stronger, have more energy, and manage your weight and your stress. Walking is an easy way to get exercise. Gradually increase the amount you walk every day. Aim for at least 30 minutes on most days of the week. You also may want to swim, bike, or do other activities. · Limit alcohol to no more than 2 drinks a day for men and 1 drink a day for women. · Do not smoke. If you need help quitting, talk to your doctor about stop-smoking programs and medicines. These can increase your chances of quitting for good. When should you call for help?   Call 911 anytime you think you may need emergency care. For example, call if:  · You have symptoms of a heart attack. These may include:  ¨ Chest pain or pressure, or a strange feeling in the chest.  ¨ Sweating. ¨ Shortness of breath. ¨ Nausea or vomiting. ¨ Pain, pressure, or a strange feeling in the back, neck, jaw, or upper belly or in one or both shoulders or arms. ¨ Lightheadedness or sudden weakness. ¨ A fast or irregular heartbeat. After you call 911, the  may tell you to chew 1 adult-strength or 2 to 4 low-dose aspirin. Wait for an ambulance. Do not try to drive yourself. · You have signs of a stroke. These may include:  ¨ Sudden numbness, paralysis, or weakness in your face, arm, or leg, especially on only one side of your body. ¨ New problems with walking or balance. ¨ Sudden vision changes. ¨ Drooling or slurred speech. ¨ New problems speaking or understanding simple statements, or feeling confused. ¨ A sudden, severe headache that is different from past headaches. · You passed out (lost consciousness). Call your doctor now or seek immediate medical care if:  · You have muscle pain or weakness. Watch closely for changes in your health, and be sure to contact your doctor if:  · You are very tired. · You have an upset stomach, gas, constipation, or belly pain or cramps. Where can you learn more? Go to [employer]  Enter C406 in the search box to learn more about \"Hyperlipidemia: After Your Visit. \"   © 6598-7354 [employer]. Care instructions adapted under license by Priscila Odell (which disclaims liability or warranty for this information). This care instruction is for use with your licensed healthcare professional. If you have questions about a medical condition or this instruction, always ask your healthcare professional. [place] [address] any warranty or liability for your use of this information.   Content Version: 0.8.465164; Last Revised: October 13, 2011                     Body Mass Index: Care Instructions  Your Care Instructions    Body mass index (BMI) can help you see if your weight is raising your risk for health problems. It uses a formula to compare how much you weigh with how tall you are. · A BMI lower than 18.5 is considered underweight. · A BMI between 18.5 and 24.9 is considered healthy. · A BMI between 25 and 29.9 is considered overweight. A BMI of 30 or higher is considered obese. If your BMI is in the normal range, it means that you have a lower risk for weight-related health problems. If your BMI is in the overweight or obese range, you may be at increased risk for weight-related health problems, such as high blood pressure, heart disease, stroke, arthritis or joint pain, and diabetes. If your BMI is in the underweight range, you may be at increased risk for health problems such as fatigue, lower protection (immunity) against illness, muscle loss, bone loss, hair loss, and hormone problems. BMI is just one measure of your risk for weight-related health problems. You may be at higher risk for health problems if you are not active, you eat an unhealthy diet, or you drink too much alcohol or use tobacco products. Follow-up care is a key part of your treatment and safety. Be sure to make and go to all appointments, and call your doctor if you are having problems. It's also a good idea to know your test results and keep a list of the medicines you take. How can you care for yourself at home? · Practice healthy eating habits. This includes eating plenty of fruits, vegetables, whole grains, lean protein, and low-fat dairy. · If your doctor recommends it, get more exercise. Walking is a good choice. Bit by bit, increase the amount you walk every day. Try for at least 30 minutes on most days of the week. · Do not smoke. Smoking can increase your risk for health problems.  If you need help quitting, talk to your doctor about stop-smoking programs and medicines. These can increase your chances of quitting for good. · Limit alcohol to 2 drinks a day for men and 1 drink a day for women. Too much alcohol can cause health problems. If you have a BMI higher than 25  · Your doctor may do other tests to check your risk for weight-related health problems. This may include measuring the distance around your waist. A waist measurement of more than 40 inches in men or 35 inches in women can increase the risk of weight-related health problems. · Talk with your doctor about steps you can take to stay healthy or improve your health. You may need to make lifestyle changes to lose weight and stay healthy, such as changing your diet and getting regular exercise. If you have a BMI lower than 18.5  · Your doctor may do other tests to check your risk for health problems. · Talk with your doctor about steps you can take to stay healthy or improve your health. You may need to make lifestyle changes to gain or maintain weight and stay healthy, such as getting more healthy foods in your diet and doing exercises to build muscle. Where can you learn more? Go to http://tristan-marina.info/. Enter S176 in the search box to learn more about \"Body Mass Index: Care Instructions. \"  Current as of: October 13, 2016  Content Version: 11.4  © 1739-8880 [employer]wise[employer] Inc[employer]. Care instructions adapted under license by [employer] (which disclaims liability or warranty for this information). If you have questions about a medical condition or this instruction, always ask your healthcare professional. [place] [address] any warranty or liability for your use of this information.

## 2018-09-17 NOTE — PROGRESS NOTES
Subjective:       Chief Complaint  The patient presents for follow up of hypertension and high cholesterol. Obesity, CAD, stage 4 esophageal cancer. HPI  Reji Grewal is a 57 y.o. male seen for follow up of hyperlipidemia. Healso has hypertension. Hypertension well controlled, no significant medication side effects noted, on Zestoretic, hyperlipidemia better controlled with pt being more consistent with taking medications, no significant medication side effects noted, on Lipitor 80 mg but with pt having CAC score 331 he needs to get LDL<70. Pt has stopped tobacco use. Diet and Lifestyle: not attempting to follow a low fat, low cholesterol diet, exercises sporadically    Home BP Monitoring: is not measured at home. Other symptoms and concerns: pt has  stage 4 esophageal cancer. He is working with his oncologist and taking chemotherapy. Pt has a excellent mental attitude. Current Outpatient Prescriptions   Medication Sig    prochlorperazine (COMPAZINE) 10 mg tablet Take 10 mg by mouth.  ondansetron hcl (ZOFRAN) 8 mg tablet Take 8 mg by mouth.  cetirizine (ZYRTEC) 10 mg tablet Take 1 Tab by mouth daily.  atorvastatin (LIPITOR) 80 mg tablet TAKE 1 TABLET BY MOUTH DAILY    docusate sodium (COLACE) 100 mg capsule Take 100 mg by mouth daily.  chlorhexidine (PERIDEX) 0.12 % solution SWISH 1 CAPFUL FOR ONE MINUTE AND SPIT OUT THREE TIMES A DAY    lisinopril-hydroCHLOROthiazide (PRINZIDE, ZESTORETIC) 20-25 mg per tablet TAKE 1 TABLET BY MOUTH ONCE DAILY    chlorpheniramine-HYDROcodone (TUSSIONEX) 10-8 mg/5 mL suspension Take 5 mL by mouth every twelve (12) hours as needed for Cough. Max Daily Amount: 10 mL.  oxyCODONE IR (ROXICODONE) 5 mg immediate release tablet Take 1-2 Tabs by mouth every four (4) hours as needed for Pain. Max Daily Amount: 60 mg. No current facility-administered medications for this visit.               Review of Systems  Respiratory: negative for dyspnea on exertion  Cardiovascular: negative for chest pain    Objective:     Visit Vitals    /71 (BP 1 Location: Right arm, BP Patient Position: Sitting)    Pulse 72    Temp 97.7 °F (36.5 °C) (Oral)    Resp 20    Ht 5' 11\" (1.803 m)    Wt 243 lb (110.2 kg)    SpO2 95%    BMI 33.89 kg/m2        General appearance - alert, well appearing, and in no distress  Neck - supple, no significant adenopathy, carotids upstroke normal bilaterally, no bruits  Chest - clear to auscultation, no wheezes, rales or rhonchi, symmetric air entry  Heart - normal rate, regular rhythm, normal S1, S2, no murmurs, rubs, clicks or gallops  Extremities - peripheral pulses normal, no pedal edema, no clubbing or cyanosis  Skin - normal coloration and turgor, no rashes, no suspicious skin lesions noted      Labs:   Lab Results   Component Value Date/Time    Cholesterol, total 158 09/10/2018 08:32 AM    HDL Cholesterol 42 09/10/2018 08:32 AM    LDL, calculated 101 (H) 09/10/2018 08:32 AM    Triglyceride 76 09/10/2018 08:32 AM    CHOL/HDL Ratio 3.1 12/04/2015 09:42 AM     Lab Results   Component Value Date/Time    ALT (SGPT) 26 09/10/2018 08:32 AM    AST (SGOT) 27 09/10/2018 08:32 AM    Alk. phosphatase 63 09/10/2018 08:32 AM    Bilirubin, total 0.4 09/10/2018 08:32 AM     Lab Results   Component Value Date/Time    GFR est AA >60 06/06/2018 12:20 PM    GFR est non-AA >60 06/06/2018 12:20 PM    Creatinine 1.0 09/10/2018 08:32 AM    BUN 15 09/10/2018 08:32 AM    Sodium 143 09/10/2018 08:32 AM    Potassium 4.0 09/10/2018 08:32 AM    Chloride 102 09/10/2018 08:32 AM    CO2 24 09/10/2018 08:32 AM            Assessment / Plan     Hypertension well controlled, on current meds  Hyperlipidemia borderline controlled, on Lipitor 80 mg daily. Pt will try to take the medication more consistently. ICD-10-CM ICD-9-CM    1. Pure hypercholesterolemia E78.00 272.0 LIPID PANEL   2.  Essential hypertension, benign O82 968.7 METABOLIC PANEL, COMPREHENSIVE   3. Adenocarcinoma of esophagus, stage 4 (HCC) C15.9 150.9 Pt continues on chemotherapy through his oncologist.    4. Screening PSA (prostate specific antigen) Z12.5 V76.44 PSA, DIAGNOSTIC (PROSTATE SPECIFIC AG)               Low cholesterol diet, weight control and daily exercise discussed. Follow-up Disposition:  Return in about 6 months (around 3/17/2019) for labs 1 week before. Reviewed plan of care. Patient has provided input and agrees with goals. Discussed the patient's BMI with him. The BMI follow up plan is as follows:     dietary management education, guidance, and counseling  encourage exercise  monitor weight  prescribed dietary intake    An After Visit Summary was printed and given to the patient.

## 2018-09-17 NOTE — MR AVS SNAPSHOT
[facility] 
 
 
 [address] [address] 
[phone] Patient: Romain Montero MRN:  :[id number] Visit Information Date & Time Provider Department Dept. Phone Encounter #  
 2018 10:00 AM Matt Posadas, [address] [place] [address] [phone] [id number] Follow-up Instructions Return in about 6 months (around 3/17/2019) for labs 1 week before. Your Appointments 10/12/2018  8:20 AM  
Follow Up with Sachin Oscar DO [facility] Appt Note: 8 months follow up; l/m to r/s appt due to provider out of office-alb; 8 month f/up [place] [id number] [address] [address] [phone] [address] [place] [address] [address] Upcoming Health Maintenance Date Due Pneumococcal 19-64 Highest Risk (1 of 3 - PCV13) 1972 Influenza Age 5 to Adult 2018 COLONOSCOPY 2024 DTaP/Tdap/Td series (2 - Td) 2024 Allergies as of 2018  Review Complete On: 2018 By: Matt Posadas MD  
  
 Severity Noted Reaction Type Reactions Iodine And Iodide Containing Products High 2018    Nausea and Vomiting Tetracycline  2010    Itching Current Immunizations  Reviewed on 9/15/2017 Name Date Influenza Vaccine 2014 Influenza Vaccine (Quad) PF 9/15/2017, 2017  2:52 PM, 2015 Influenza Vaccine PF 2013 Tdap 2014 Zoster Vaccine, Live 2016 Not reviewed this visit You Were Diagnosed With   
  
 Codes Comments Pure hypercholesterolemia    -  Primary ICD-10-CM: E78.00 ICD-9-CM: 272.0 Essential hypertension, benign     ICD-10-CM: I10 
ICD-9-CM: 401.1 Vitals BP Pulse Temp Resp Height(growth percentile) Weight(growth percentile)  116/71 (BP 1 Location: Right arm, BP Patient Position: Sitting) 72 97.7 °F (36.5 °C) (Oral) 20 5' 11\" (1.803 m) 243 lb (110.2 kg) SpO2 BMI Smoking Status 95% 33.89 kg/m2 Former Smoker BMI and BSA Data Body Mass Index Body Surface Area  
 33.89 kg/m 2 2.35 m 2 Preferred Pharmacy Pharmacy Name Phone [address], [address] [place] [address] [phone] Your Updated Medication List  
  
   
This list is accurate as of 9/17/18 10:23 AM.  Always use your most recent med list.  
  
  
  
  
 atorvastatin 80 mg tablet Commonly known as:  LIPITOR  
TAKE 1 TABLET BY MOUTH DAILY  
  
 cetirizine 10 mg tablet Commonly known as:  ZYRTEC Take 1 Tab by mouth daily. chlorhexidine 0.12 % solution Commonly known as:  PERIDEX  
SWISH 1 CAPFUL FOR ONE MINUTE AND SPIT OUT THREE TIMES A DAY  
  
 chlorpheniramine-HYDROcodone 10-8 mg/5 mL suspension Commonly known as:  Silviano Sor Take 5 mL by mouth every twelve (12) hours as needed for Cough. Max Daily Amount: 10 mL. COLACE 100 mg capsule Generic drug:  docusate sodium Take 100 mg by mouth daily. COMPAZINE 10 mg tablet Generic drug:  prochlorperazine Take 10 mg by mouth.  
  
 lisinopril-hydroCHLOROthiazide 20-25 mg per tablet Commonly known as:  PRINZIDE, ZESTORETIC  
TAKE 1 TABLET BY MOUTH ONCE DAILY  
  
 ondansetron hcl 8 mg tablet Commonly known as:  Kacy Scriver Take 8 mg by mouth. oxyCODONE IR 5 mg immediate release tablet Commonly known as:  Dondra Elio Take 1-2 Tabs by mouth every four (4) hours as needed for Pain. Max Daily Amount: 60 mg. Follow-up Instructions Return in about 6 months (around 3/17/2019) for labs 1 week before. Patient Instructions Hyperlipidemia: After Your Visit Your Care Instructions Hyperlipidemia is too much fat in your blood. The body has several kinds of fat, including cholesterol and triglycerides.  Your body needs fat for many things, such as making new cells. But too much fat in your blood increases your chances of having a heart attack or stroke. You may be able to lower your cholesterol and triglycerides with a heart-healthy diet, exercise, and if needed, medicine. Your doctor may want you to try lifestyle changes first to see whether they lower the fat in your blood. You may need to take medicine if lifestyle changes do not lower the fat in your blood enough. Follow-up care is a key part of your treatment and safety. Be sure to make and go to all appointments, and call your doctor if you are having problems. Its also a good idea to know your test results and keep a list of the medicines you take. How can you care for yourself at home? Take your medicines · Take your medicines exactly as prescribed. Call your doctor if you think you are having a problem with your medicine. · If you take medicine to lower your cholesterol, go to follow-up visits. You will need to have blood tests. · Do not take large doses of niacin, which is a B vitamin, while taking medicine called statins. It may increase the chance of muscle pain and liver problems. · Talk to your doctor about avoiding grapefruit juice if you are taking statins. Grapefruit juice can raise the level of this medicine in your blood. This could increase side effects. Eat more fruits, vegetables, and fiber · Fruits and vegetables have lots of nutrients that help protect against heart disease, and they have littleif anyfat. Try to eat at least five servings a day. Dark green, deep orange, or yellow fruits and vegetables are healthy choices. · Keep carrots, celery, and other veggies handy for snacks. Buy fruit that is in season and store it where you can see it so that you will be tempted to eat it. Cook dishes that have a lot of veggies in them, such as stir-fries and soups.  
· Foods high in fiber may reduce your cholesterol and provide important vitamins and minerals. High-fiber foods include whole-grain cereals and breads, oatmeal, beans, brown rice, citrus fruits, and apples. · Buy whole-grain breads and cereals instead of white bread and pastries. Limit saturated fat · Read food labels and try to avoid saturated fat and trans fat. They increase your risk of heart disease. · Use olive or canola oil when you cook. Try cholesterol-lowering spreads, such as Benecol or Take Control. · Bake, broil, grill, or steam foods instead of frying them. · Limit the amount of high-fat meats you eat, including hot dogs and sausages. Cut out all visible fat when you prepare meat. · Eat fish, skinless poultry, and soy products such as tofu instead of high-fat meats. Soybeans may be especially good for your heart. Eat at least two servings of fish a week. Certain fish, such as salmon, contain omega-3 fatty acids, which may help reduce your risk of heart attack. · Choose low-fat or fat-free milk and dairy products. Get exercise, limit alcohol, and quit smoking · Get more exercise. Work with your doctor to set up an exercise program. Even if you can do only a small amount, exercise will help you get stronger, have more energy, and manage your weight and your stress. Walking is an easy way to get exercise. Gradually increase the amount you walk every day. Aim for at least 30 minutes on most days of the week. You also may want to swim, bike, or do other activities. · Limit alcohol to no more than 2 drinks a day for men and 1 drink a day for women. · Do not smoke. If you need help quitting, talk to your doctor about stop-smoking programs and medicines. These can increase your chances of quitting for good. When should you call for help? Call 911 anytime you think you may need emergency care. For example, call if: 
· You have symptoms of a heart attack. These may include: ¨ Chest pain or pressure, or a strange feeling in the chest. 
¨ Sweating. ¨ Shortness of breath. ¨ Nausea or vomiting. ¨ Pain, pressure, or a strange feeling in the back, neck, jaw, or upper belly or in one or both shoulders or arms. ¨ Lightheadedness or sudden weakness. ¨ A fast or irregular heartbeat. After you call 911, the  may tell you to chew 1 adult-strength or 2 to 4 low-dose aspirin. Wait for an ambulance. Do not try to drive yourself. · You have signs of a stroke. These may include: 
¨ Sudden numbness, paralysis, or weakness in your face, arm, or leg, especially on only one side of your body. ¨ New problems with walking or balance. ¨ Sudden vision changes. ¨ Drooling or slurred speech. ¨ New problems speaking or understanding simple statements, or feeling confused. ¨ A sudden, severe headache that is different from past headaches. · You passed out (lost consciousness). Call your doctor now or seek immediate medical care if: 
· You have muscle pain or weakness. Watch closely for changes in your health, and be sure to contact your doctor if: 
· You are very tired. · You have an upset stomach, gas, constipation, or belly pain or cramps. Where can you learn more? Go to FireLayers.be Enter C406 in the search box to learn more about \"Hyperlipidemia: After Your Visit. \"  
© 6939-6098 Healthwise, Incorporated. Care instructions adapted under license by [employer] (which disclaims liability or warranty for this information). This care instruction is for use with your licensed healthcare professional. If you have questions about a medical condition or this instruction, always ask your healthcare professional. [address] any warranty or liability for your use of this information. Content Version: 2.9.743754; Last Revised: October 13, 2011 Introducing [facility]! Dear Porsah Buck: Thank you for requesting a [employer] account.   Our records indicate that you already have an active [employer] account. You can access your account anytime at [email or web address] Did you know that you can access your hospital and ER discharge instructions at any time in [employer]? You can also review all of your test results from your hospital stay or ER visit. Additional Information If you have questions, please visit the Frequently Asked Questions section of the [employer] website at [email or web address] Remember, [employer] is NOT to be used for urgent needs. For medical emergencies, dial 911. Now available from your iPhone and Android! Please provide this summary of care documentation to your next provider. Your primary care clinician is listed as LAYA CALABRESE. If you have any questions after today's visit, please call [phone].

## 2018-09-17 NOTE — PROGRESS NOTES
Patient is in the office today for 6 month follow up. 1. Have you been to the ER, urgent care clinic since your last visit? Hospitalized since your last visit? No    2. Have you seen or consulted any other health care providers outside of the [facility] since your last visit? Include any pap smears or colon screening.  yes Dr Merissa Simpson oncologist

## 2018-09-18 NOTE — PROGRESS NOTES
Tao Leyva 1955 No chief complaint on file. HISTORY OF PRESENT ILLNESS Jeff Wilson is a 57 y.o. male who presents today for reevaluation of left shoulder pain. Patient rates pain as 2/10 today. At last OV, patient had a cortisone injection which provided good relief. The patient notes he was recently diagnosed with Stage 4 esophogeal cancer. The shoulder pain has gradually returned and he is requesting an injection today. Pain with certain activities. Patient denies any fever, chills, chest pain, shortness of breath or calf pain. The remainder of the review of systems is negative. There are no new illness or injuries to report since last seen in the office. There are no changes to medications, allergies, family or social history. PHYSICAL EXAM:  
Visit Vitals  /79  Pulse 69  Temp 97.8 °F (36.6 °C) (Oral)  Resp 16  
 Ht 5' 11\" (1.803 m)  Wt 242 lb (109.8 kg)  SpO2 98%  BMI 33.75 kg/m2 The patient is a well-developed, well-nourished male   in no acute distress. The patient is alert and oriented times three. The patient is alert and oriented times three. Mood and affect are normal. 
LYMPHATIC: lymph nodes are not enlarged and are within normal limits SKIN: normal in color and non tender to palpation. There are no bruises or abrasions noted. NEUROLOGICAL: Motor sensory exam is within normal limits. Reflexes are equal bilaterally. There is normal sensation to pinprick and light touch MUSCULOSKELETAL: 
Examination Left shoulder Skin Intact AC joint tenderness - Biceps tenderness - Forward flexion/Elevation  Active abduction  Glenohumeral abduction 90 External rotation ROM 60 Internal rotation ROM 30 Apprehension -  
Jayros Relocation -  
Jerk - Load and Shift -  
Dannis Medico - Speeds - Impingement sign + Supraspinatus/Empty Can -, 5/5 External Rotation Strength -, 5/5 Lift Off/Belly Press -, 5/5  
 Neurovascular Intact  
  
PROCEDURE: Left Shoulder Injection with Ultrasound Guidance After sterile prep, 6 cc of Xylocaine and 1 cc of Kenalog were injected into the left shoulder. Ultrasound images captured using [address] [place] [address] Ultrasound machine and scanned into patient's chart. [facility] PROCEDURE PROGRESS NOTE Chart reviewed for the following: 
Dora Patel M.D, have reviewed the History, Physical and updated the [facility] performed immediately prior to start of procedure: 
I, Kinga Daniels M.D, have performed the following reviews on Tao Leyva prior to the start of the procedure: 
         
* Patient was identified by name and date of birth * Agreement on procedure being performed was verified * Risks and Benefits explained to the patient * Procedure site verified and marked as necessary * Patient was positioned for comfort * Consent was signed and verified Time: 10:05 AM  
 
Date of procedure: 9/18/2018 Procedure performed by:  Kinga Daniels M.D Provider assisted by: (see medication administration) How tolerated by patient: tolerated the procedure well with no complications Comments: none IMAGING: 3 view of the left shoulder dated 9/6/17 was reviewed and read: mild to moderate AC joint degenerative changes IMPRESSION:   
  ICD-10-CM ICD-9-CM 1. Subacromial bursitis of left shoulder joint M75.52 726.19 TRIAMCINOLONE ACETONIDE INJ  
   triamcinolone acetonide (KENALOG) 40 mg/mL injection US GUIDE INJ/ASP/ARTHRO LG JNT/BURSA 2. Osteoarthritis of left AC (acromioclavicular) joint M19.012 715.91   
  
 
PLAN:  
1. The patient presents today with returning left shoulder pain and I am hopeful an injection today will help relieve the pain. Risk factors include: htn 2. No ultrasound exam indicated today 3. Yes cortisone injection indicated today L SHOULDER US 
4. No Physical/Occupational Therapy indicated today 5. No diagnostic test indicated today: 6. No durable medical equipment indicated today 7. No referral indicated today 8. No medications indicated today: 9. No Narcotic indicated today RTC prn Follow-up Disposition: Not on File Scribed by Sparkle Santos ([address] [place] [address] [address]) as dictated by Marimar Guevara MD 
 
I, Dr. Marimar Guevara, confirm that all documentation is accurate.  
 
Marimar Guevara M.D.  
[address] [place] [address] and Spine Specialist

## 2018-10-18 NOTE — PROGRESS NOTES
VORB: Administer Flu./Tim Jean MD Tristan Fletcher Loni Pila , CCT  Patient received Fluvaccine 0.5ml in Left deltoid. Tolerated well. No signs or symptoms of distress noted. Most current VIS given and consent signed. he was observed for 10 min post injection. There were no reactions observed.

## 2018-10-23 NOTE — PROGRESS NOTES
HPI:  I saw Cari Meadows in my office today in cardiovascular evaluation regarding an abnormal CT of the chest that he's had recently. Mr. Claudia Harris is a pleasant 60 year old gentleman with history of hypertension, hyperlipidemia, and an abnormal coronary artery calcium score at 331 completed, along with some mild ascending aortic dilatation. He has historically been a smoker, but quit smoking within the last couple of months. He did have a CT scan on 12/7/2015 and again on 12/23/2015. The initial CT was for a calcium score which was 331, and the second CT was for further evaluation of some chest nodules. Both CTs demonstrated mildly dilated ascending aorta at 4.3 x 4.3 cm at largest dimension, suggesting a very early ascending aortic aneurysm. He was diagnosed with metastatic adenocarcinoma to the lung and liver with apparently significant liver lymphadenopathy. The patient relates that this was diagnosed with a lung biopsy by Dr. Ivet Pack and for which he is getting chemotherapy. He has had about a 25 pound weight loss when this started well over a year ago, but he is now regained most of his weight although is still down about 8 pounds from when I saw him prior to the cancer diagnosis in May 2017. He comes in today relates that he is continued to do quite well from a cardiovascular vantage. He is not having any problems with chest pain, shortness of breath, or any other cardiovascular complaints at this time. Encounter Diagnoses   Name Primary?  Essential hypertension, benign Yes    Pure hypercholesterolemia     Metastatic adenocarcinoma ([facility] [address]     Sleep apnea, unspecified type     Agatston coronary artery calcium score between 200 and 399        Discussion:  This patient appears to be doing about as well as we could expect and I really have no recommendations for change currently, but he has been on chemotherapy for some time since his diagnosis of metastatic adenocarcinoma in August 2017 and he has had no testing to reevaluate his LV function and so I am going to get an echocardiogram with strain to be sure that his chemotherapy is not creating any issues with LV dysfunction. His latest lipid profile which was completed on September 10, 2018 demonstrated total cholesterol 158 with triglycerides of 76, HDL of 42, LDL of 101, and VLDL of 15 which I think is only fair control on his Lipitor 80 mg daily. He does admit that he has not been very compulsive about taking his Lipitor and is going to try to do a better job of that, but after his chemotherapy runs every couple of weeks he does feel fairly sick for a few days. His blood pressure is well-controlled today and his EKG is stable so I am simply can plan to see him again in several months or sooner if his echocardiogram is significantly abnormal and we decide to adjust his medications moving forward. PCP: Carly Adams MD      Past Medical History:   Diagnosis Date    Adenocarcinoma of esophagus, stage 4 ([facility] [address] 6/4/2018    Agatston CAC score 200-399 12/03/2015    Coronary calcium score 331.  Essential hypertension, benign     History of tobacco use     quit 1/2016    Hoarseness of voice     Joint pain     Mediastinal mass     Pure hypercholesterolemia     Sleep apnea     CPAP    Tobacco dependency        Past Surgical History:   Procedure Laterality Date    HX OTHER SURGICAL Right 2011    Removal of shoulder bone spur     HX VASCULAR ACCESS      mediport left shoulder       Current Outpatient Rx   Name  Route  Sig  Dispense  Refill    lisinopril-hydrochlorothiazide (PRINZIDE, ZESTORETIC) 20-25 mg per tablet    Oral    Take 1 Tab by mouth daily. 90 Tab    3      atorvastatin (LIPITOR) 80 mg tablet    Oral    Take 1 Tab by mouth daily. 90 Tab    3      ibuprofen (MOTRIN) 600 mg tablet    Oral    Take 600 mg by mouth as needed for Pain.               fluticasone (FLONASE) 50 mcg/actuation nasal spray        1 spray to each nostril daily    1 Bottle    3         Allergies   Allergen Reactions    Iodine And Iodide Containing Products Nausea and Vomiting    Tetracycline Itching       Social History :  Social History     Tobacco Use    Smoking status: Former Smoker     Packs/day: 1.00     Years: 20.00     Pack years: 20.00     Types: Cigarettes     Last attempt to quit: 2016     Years since quittin.8    Smokeless tobacco: Never Used   Substance Use Topics    Alcohol use: Yes     Comment: occassionally/RARELY        Family History: family history includes Diabetes in his brother and father; Heart Attack in his brother; Heart Failure in his father; Kidney Disease in his mother; Stroke in his brother. Review of Systems:    Constitutional: Negative. Respiratory: Positive for shortness of breath. Negative for cough, hemoptysis and wheezing. Cardiovascular: Negative. Gastrointestinal: Negative. Musculoskeletal: Positive for myalgias. Negative for falls and joint pain. Neurological: Negative for dizziness. Physical Exam:    The patient is a cooperative, alert, well developed, well nourished 57 y.o. dark skinned  male who is in no acute distress at the time of the examination. Visit Vitals  /74   Pulse 63   Ht 5' 11\" (1.803 m)   Wt 110.7 kg (244 lb)   SpO2 97%   BMI 34.03 kg/m²       HEENT: Conjuctiva white, mucosa moist, no pallor or cyanosis. NECK: Supple without masses, tenderness or thyromegaly. There was no jugular venous distention. Carotid are full bilaterally without bruits. CHEST: Symmetrical with good excursion. LUNGS: Clear to auscultation in all fields. HEART: The apex is not displaced. There were no lifts, thrills or heaves. There is a normal S1 and S2 without appreciable murmurs, rubs, clicks, or gallops auscultated. ABDOMEN: Soft without masses, tenderness or organomegaly. EXTREMITIES: Full peripheral pulses without peripheral edema.       Review of Data: See PMH and Cardiology and Imaging sections for cardiac testing  Lab Results   Component Value Date/Time    Cholesterol, total 158 09/10/2018 08:32 AM    HDL Cholesterol 42 09/10/2018 08:32 AM    LDL, calculated 101 (H) 09/10/2018 08:32 AM    Triglyceride 76 09/10/2018 08:32 AM    CHOL/HDL Ratio 3.1 12/04/2015 09:42 AM       Results for orders placed or performed in visit on 10/23/18   AMB POC EKG ROUTINE W/ 12 LEADS, INTER & REP     Status: None    Narrative    Normal sinus rhythm with rate 63. There is early R wave transition anteriorly, but otherwise this tracing is within normal limits and similar to the tracing of January 8, 2018. Jessica Barnard D.O., F.A.C.C. [facility]  [facility] [place]  [address] [place] [address]. [address]    PLEASE NOTE:  This document has been produced using voice recognition software. Unrecognized errors in transcription may be present.

## 2018-11-01 NOTE — PROGRESS NOTES
Per your last note \" This patient appears to be doing about as well as we could expect and I really have no recommendations for change currently, but he has been on chemotherapy for some time since his diagnosis of metastatic adenocarcinoma in August 2017 and he has had no testing to reevaluate his LV function and so I am going to get an echocardiogram with strain to be sure that his chemotherapy is not creating any issues with LV dysfunction.

## 2018-11-03 NOTE — PROGRESS NOTES
This gentlemen's echocardiogram shows completely normal left ventricular function. I just wanted to be sure that his chemotherapy had to created any heart damage and it does not appear to have done so. Please let him know.  ES

## 2018-12-10 NOTE — PROGRESS NOTES
HISTORY OF PRESENT ILLNESS  Tao Reid is a 70 y.o. male. Pt has a h/o esophageal cancer and is currently on chemotherapy and presents with sore throat as below        Sore Throat    The history is provided by the patient. This is a new problem. The current episode started more than 2 days ago. The problem has been gradually worsening. There has been a fever of 100 - 100.9 F. Associated symptoms include congestion, ear pain (right ) and cough. Pertinent negatives include no shortness of breath. He has tried nothing for the symptoms. The treatment provided no relief. Review of Systems   Constitutional: Positive for fever. HENT: Positive for congestion, ear pain (right ) and sore throat. Respiratory: Positive for cough. Negative for shortness of breath. Musculoskeletal: Positive for myalgias. Physical Exam   Constitutional: He appears well-developed and well-nourished. HENT:   Right Ear: Tympanic membrane normal.   Left Ear: Tympanic membrane and ear canal normal.   Nose: Mucosal edema present. Mouth/Throat: Uvula is midline and mucous membranes are normal. Posterior oropharyngeal erythema present. Right ear canal at the bottom 6 o'clock position has some erythema     Eyes: Conjunctivae and EOM are normal. Pupils are equal, round, and reactive to light. Cardiovascular: Normal rate, regular rhythm and normal heart sounds. Pulmonary/Chest: Effort normal and breath sounds normal.   Vitals reviewed. ASSESSMENT and PLAN    ICD-10-CM ICD-9-CM    1. Pharyngitis, unspecified etiology J02.9 462 Will treat with azithromycin (ZITHROMAX) 250 mg tablet   2. Cough R05 786.2 Treat with guaiFENesin-codeine (CHERATUSSIN AC) 100-10 mg/5 mL solution   3. Sore throat J02.9 462       AMB POC RAPID STREP A negative       AMB POC RAPID INFLUENZA TEST negative      Reviewed plan of care. Patient has provided input and agrees with goals.

## 2018-12-10 NOTE — PROGRESS NOTES
Patient is in the office today for exposure to strep throat, patient has a fever and is also complaining of body aches. 1. Have you been to the ER, urgent care clinic since your last visit? Hospitalized since your last visit? No    2. Have you seen or consulted any other health care providers outside of the [address] [place] [address] since your last visit? Include any pap smears or colon screening.  No

## 2018-12-10 NOTE — PATIENT INSTRUCTIONS
Sore Throat: Care Instructions  Your Care Instructions    Infection by bacteria or a virus causes most sore throats. Cigarette smoke, dry air, air pollution, allergies, and yelling can also cause a sore throat. Sore throats can be painful and annoying. Fortunately, most sore throats go away on their own. If you have a bacterial infection, your doctor may prescribe antibiotics. Follow-up care is a key part of your treatment and safety. Be sure to make and go to all appointments, and call your doctor if you are having problems. It's also a good idea to know your test results and keep a list of the medicines you take. How can you care for yourself at home? · If your doctor prescribed antibiotics, take them as directed. Do not stop taking them just because you feel better. You need to take the full course of antibiotics. · Gargle with warm salt water once an hour to help reduce swelling and relieve discomfort. Use 1 teaspoon of salt mixed in 1 cup of warm water. · Take an over-the-counter pain medicine, such as acetaminophen (Tylenol), ibuprofen (Advil, Motrin), or naproxen (Aleve). Read and follow all instructions on the label. · Be careful when taking over-the-counter cold or flu medicines and Tylenol at the same time. Many of these medicines have acetaminophen, which is Tylenol. Read the labels to make sure that you are not taking more than the recommended dose. Too much acetaminophen (Tylenol) can be harmful. · Drink plenty of fluids. Fluids may help soothe an irritated throat. Hot fluids, such as tea or soup, may help decrease throat pain. · Use over-the-counter throat lozenges to soothe pain. Regular cough drops or hard candy may also help. These should not be given to young children because of the risk of choking. · Do not smoke or allow others to smoke around you. If you need help quitting, talk to your doctor about stop-smoking programs and medicines.  These can increase your chances of quitting for good. · Use a vaporizer or humidifier to add moisture to your bedroom. Follow the directions for cleaning the machine. When should you call for help? Call your doctor now or seek immediate medical care if:    · You have new or worse trouble swallowing.     · Your sore throat gets much worse on one side.    Watch closely for changes in your health, and be sure to contact your doctor if you do not get better as expected. Where can you learn more? Go to http://tristan-marina.info/. Enter 062 [id number] 80 [id number] in the search box to learn more about \"Sore Throat: Care Instructions. \"  Current as of: March 28, 2018  Content Version: 11.8  © 1169-9450 [employer]. Care instructions adapted under license by [employer] (which disclaims liability or warranty for this information). If you have questions about a medical condition or this instruction, always ask your healthcare professional. [address] any warranty or liability for your use of this information.

## 2019-01-01 ENCOUNTER — TELEPHONE (OUTPATIENT)
Dept: FAMILY MEDICINE CLINIC | Age: 64
End: 2019-01-01

## 2019-01-01 ENCOUNTER — OFFICE VISIT (OUTPATIENT)
Dept: FAMILY MEDICINE CLINIC | Age: 64
End: 2019-01-01

## 2019-01-01 ENCOUNTER — OFFICE VISIT (OUTPATIENT)
Dept: ORTHOPEDIC SURGERY | Age: 64
End: 2019-01-01

## 2019-01-01 VITALS
OXYGEN SATURATION: 95 % | HEART RATE: 91 BPM | RESPIRATION RATE: 20 BRPM | BODY MASS INDEX: 34.3 KG/M2 | TEMPERATURE: 97.7 F | DIASTOLIC BLOOD PRESSURE: 67 MMHG | WEIGHT: 245 LBS | HEIGHT: 71 IN | SYSTOLIC BLOOD PRESSURE: 146 MMHG

## 2019-01-01 VITALS
HEIGHT: 71 IN | OXYGEN SATURATION: 95 % | SYSTOLIC BLOOD PRESSURE: 136 MMHG | BODY MASS INDEX: 34.3 KG/M2 | WEIGHT: 245 LBS | RESPIRATION RATE: 20 BRPM | TEMPERATURE: 98.4 F | HEART RATE: 98 BPM | DIASTOLIC BLOOD PRESSURE: 74 MMHG

## 2019-01-01 VITALS
DIASTOLIC BLOOD PRESSURE: 60 MMHG | OXYGEN SATURATION: 97 % | HEIGHT: 71 IN | HEART RATE: 83 BPM | TEMPERATURE: 97.8 F | BODY MASS INDEX: 34.07 KG/M2 | SYSTOLIC BLOOD PRESSURE: 98 MMHG | WEIGHT: 243.4 LBS | RESPIRATION RATE: 16 BRPM

## 2019-01-01 DIAGNOSIS — E78.00 PURE HYPERCHOLESTEROLEMIA: ICD-10-CM

## 2019-01-01 DIAGNOSIS — I10 ESSENTIAL HYPERTENSION, BENIGN: ICD-10-CM

## 2019-01-01 DIAGNOSIS — M70.51 PES ANSERINUS BURSITIS OF RIGHT KNEE: ICD-10-CM

## 2019-01-01 DIAGNOSIS — S50.01XA CONTUSION OF RIGHT ELBOW, INITIAL ENCOUNTER: Primary | ICD-10-CM

## 2019-01-01 DIAGNOSIS — C15.9 ADENOCARCINOMA OF ESOPHAGUS, STAGE 4 (HCC): ICD-10-CM

## 2019-01-01 DIAGNOSIS — M25.562 ACUTE PAIN OF LEFT KNEE: ICD-10-CM

## 2019-01-01 DIAGNOSIS — M25.521 RIGHT ELBOW PAIN: ICD-10-CM

## 2019-01-01 DIAGNOSIS — Z00.00 WELCOME TO MEDICARE PREVENTIVE VISIT: Primary | ICD-10-CM

## 2019-01-01 DIAGNOSIS — M25.562 ACUTE PAIN OF LEFT KNEE: Primary | ICD-10-CM

## 2019-01-01 LAB
A-G RATIO,AGRAT: 1.6 RATIO (ref 1.1–2.6)
ALBUMIN SERPL-MCNC: 3.8 G/DL (ref 3.5–5)
ALP SERPL-CCNC: 63 U/L (ref 40–125)
ALT SERPL-CCNC: 26 U/L (ref 5–40)
ANION GAP SERPL CALC-SCNC: 20 MMOL/L
AST SERPL W P-5'-P-CCNC: 19 U/L (ref 10–37)
BILIRUB SERPL-MCNC: 0.3 MG/DL (ref 0.2–1.2)
BUN SERPL-MCNC: 15 MG/DL (ref 6–22)
CALCIUM SERPL-MCNC: 9.1 MG/DL (ref 8.4–10.4)
CHLORIDE SERPL-SCNC: 104 MMOL/L (ref 98–110)
CHOLEST SERPL-MCNC: 184 MG/DL (ref 110–200)
CO2 SERPL-SCNC: 22 MMOL/L (ref 20–32)
CREAT SERPL-MCNC: 0.9 MG/DL (ref 0.8–1.6)
GFRAA, 66117: >60
GFRNA, 66118: >60
GLOBULIN,GLOB: 2.4 G/DL (ref 2–4)
GLUCOSE SERPL-MCNC: 102 MG/DL (ref 70–99)
HDLC SERPL-MCNC: 39 MG/DL (ref 40–59)
HDLC SERPL-MCNC: 4.7 MG/DL (ref 0–5)
LDLC SERPL CALC-MCNC: 126 MG/DL (ref 50–99)
POTASSIUM SERPL-SCNC: 3.9 MMOL/L (ref 3.5–5.5)
PROT SERPL-MCNC: 6.2 G/DL (ref 6.2–8.1)
PSA SERPL-MCNC: 1.8 NG/ML
SODIUM SERPL-SCNC: 146 MMOL/L (ref 133–145)
TRIGL SERPL-MCNC: 96 MG/DL (ref 40–149)
VLDLC SERPL CALC-MCNC: 19 MG/DL (ref 8–30)

## 2019-01-01 RX ORDER — ATORVASTATIN CALCIUM 80 MG/1
TABLET, FILM COATED ORAL
Qty: 90 TAB | Refills: 3 | Status: SHIPPED | OUTPATIENT
Start: 2019-01-01

## 2019-01-01 RX ORDER — TRAMADOL HYDROCHLORIDE 50 MG/1
50 TABLET ORAL
Qty: 28 TAB | Refills: 0 | Status: ON HOLD | OUTPATIENT
Start: 2019-01-01 | End: 2019-01-01

## 2019-01-01 RX ORDER — LISINOPRIL AND HYDROCHLOROTHIAZIDE 20; 25 MG/1; MG/1
TABLET ORAL
Qty: 90 TAB | Refills: 3 | Status: SHIPPED | OUTPATIENT
Start: 2019-01-01

## 2019-01-31 NOTE — PROGRESS NOTES
Patient is in the office today for fall and left knee pain. 1. Have you been to the ER, urgent care clinic since your last visit? Hospitalized since your last visit? No 
 
2. Have you seen or consulted any other health care providers outside of the [address] [place] [address] since your last visit? Include any pap smears or colon screening.  No

## 2019-01-31 NOTE — PATIENT INSTRUCTIONS
Learning About Joint Injections What are joint injections? Joint injections are shots into a joint, such as the knee or shoulder. They are used to put in medicines, such as pain relievers and steroid medicines. Steroids can be injected directly into a swollen and painful joint to reduce inflammation. A steroid shot can sometimes help with short-term pain relief when other treatments haven't worked. If steroid shots help, pain may improve for weeks or months. How are they done? First, the area over the joint will be cleaned. Your doctor may then use a tiny needle to numb the skin in the area where you will get the joint injection. If a tiny needle is used to numb the area, your doctor will use another needle to inject the medicine. Your doctor may use a pain reliever, a steroid, or both. You may feel some pressure or discomfort. The procedure takes 10 to 30 minutes. But the injection itself usually takes only a few minutes. Your doctor may put ice on the area before you go home. You will probably go home soon after your shot. What can you expect after a joint injection? You may have numbness around the joint for a few hours. If your shot included both a pain reliever and a steroid, then the pain will probably go away right away. But it might come back after a few hours. This might happen if the pain reliever wears off and the steroid hasn't started to work yet. Steroids don't always work. But when they do, the pain relief can last for several days to a few months or longer. Your doctor may tell you to use ice on the area. You can also use ice if the pain comes back. Put ice or a cold pack on your joint for 10 to 20 minutes at a time. Put a thin cloth between the ice and your skin. Follow your doctor's instructions carefully. Follow-up care is a key part of your treatment and safety.  Be sure to make and go to all appointments, and call your doctor if you are having problems. It's also a good idea to know your test results and keep a list of the medicines you take. Where can you learn more? Go to http://tristan-marina.info/. Enter J192 in the search box to learn more about \"Learning About Joint Injections. \" Current as of: September 20, 2018 Content Version: 11.9 © 8810-5913 [employer]. Care instructions adapted under license by [employer] (which disclaims liability or warranty for this information). If you have questions about a medical condition or this instruction, always ask your healthcare professional. [place] [address] any warranty or liability for your use of this information.

## 2019-02-02 NOTE — PROGRESS NOTES
Raz Gracia is a 70 y.o.  male and presents with Fall (2 weeks at home); Knee Pain (left only on kneeling ); and Elbow Pain (right ) SUBJECTIVE: 
 
Patient about 2 weeks ago fell at home and hit his left knee and right elbow. Is been having pain in his left knee whenever he kneels down. He feels it right on the kneecap. He otherwise does not feel any knee pain walking standing or sitting. He also feels right elbow pain whenever he has to lay his elbow on a surface otherwise he does not have constant elbow pain. He denies any swelling in either joint. The pain has not improved over the last 2 weeks with activity as above. Respiratory ROS: negative for - shortness of breath Cardiovascular ROS: negative for - chest pain Current Outpatient Medications Medication Sig  
 montelukast (SINGULAIR) 10 mg tablet Take 1 Tab by mouth daily.  guaiFENesin-codeine (CHERATUSSIN AC) 100-10 mg/5 mL solution Take 10 mL by mouth four (4) times daily as needed for Cough. Max Daily Amount: 40 mL.  chlorhexidine (PERIDEX) 0.12 % solution SWISH 1 CAPFUL FOR ONE MINUTE AND SPIT OUT THREE TIMES A DAY  lisinopril-hydroCHLOROthiazide (PRINZIDE, ZESTORETIC) 20-25 mg per tablet TAKE 1 TABLET BY MOUTH ONCE DAILY  prochlorperazine (COMPAZINE) 10 mg tablet Take 10 mg by mouth.  ondansetron hcl (ZOFRAN) 8 mg tablet Take 8 mg by mouth.  cetirizine (ZYRTEC) 10 mg tablet Take 1 Tab by mouth daily.  atorvastatin (LIPITOR) 80 mg tablet TAKE 1 TABLET BY MOUTH DAILY  docusate sodium (COLACE) 100 mg capsule Take 100 mg by mouth daily.  oxyCODONE IR (ROXICODONE) 5 mg immediate release tablet Take 1-2 Tabs by mouth every four (4) hours as needed for Pain. Max Daily Amount: 60 mg. No current facility-administered medications for this visit. OBJECTIVE: 
alert, well appearing, and in no distress Visit Vitals /67 (BP 1 Location: Left arm, BP Patient Position: Sitting) Pulse 91  
 Temp 97.7 °F (36.5 °C) (Oral) Resp 20 Ht 5' 11\" (1.803 m) Wt 245 lb (111.1 kg) SpO2 95% BMI 34.17 kg/m²  
  
well developed and well nourished Musculoskeletal -pain on left kneecap with palpation. No swelling or swelling of left knee. Right elbow without any tenderness to palpation or swelling. Assessment/Plan ICD-10-CM ICD-9-CM 1. Acute pain of left knee M25.562 719.46 REFERRAL TO ORTHOPEDICS 2. Right elbow pain M25.521 719.42 REFERRAL TO ORTHOPEDICS Follow-up Disposition: 
Return if symptoms worsen or fail to improve. Reviewed plan of care. Patient has provided input and agrees with goals.

## 2019-02-07 NOTE — PROGRESS NOTES
Tao Leyva 1955 Chief Complaint Patient presents with  Knee Pain  
  left knee pain due to fall 1 month ago  Elbow Pain  
  right elbow pain due to fall 1 month ago HISTORY OF PRESENT ILLNESS Allegra Potter is a 70 y.o. male who presents today for reevaluation of left knee and right elbow pain. The patient was referred by Paul Man. Patient rates pain as 0/10 today. He states the pain begin 1 month ago due to a fall. The patient notes he was recently diagnosed with Stage 4 esophogeal cancer and is undergoing chemotherapy. He reports with palpation in the elbow, but no pain with movement. He has tenderness in the anterior knee, pain with prolonged walking and standing. Patient denies any fever, chills, chest pain, shortness of breath or calf pain. The remainder of the review of systems is negative. There are no new illness or injuries to report since last seen in the office. There are no changes to medications, allergies, family or social history. PHYSICAL EXAM:  
Visit Vitals BP 98/60 Pulse 83 Temp 97.8 °F (36.6 °C) (Oral) Resp 16 Ht 5' 11\" (1.803 m) Wt 243 lb 6.4 oz (110.4 kg) SpO2 97% BMI 33.95 kg/m² The patient is a well-developed, well-nourished male   in no acute distress. The patient is alert and oriented times three. The patient is alert and oriented times three. Mood and affect are normal. 
LYMPHATIC: lymph nodes are not enlarged and are within normal limits SKIN: normal in color and non tender to palpation. There are no bruises or abrasions noted. NEUROLOGICAL: Motor sensory exam is within normal limits. Reflexes are equal bilaterally. There is normal sensation to pinprick and light touch MUSCULOSKELETAL: 
Examination Left knee Skin Intact Range of motion 0-130 Effusion - Medial joint line tenderness - Lateral joint line tenderness - Tenderness Pes Bursa - Tenderness insertion MCL - Tenderness insertion LCL -  
 Altheas -  
Patella crepitus - Patella grind - Lachman - Pivot shift - Anterior drawer - Posterior drawer -  
Varus stress -  
Valgus stress - Neurovascular Intact Calf Swelling and Tenderness to Palpation -  
Annette's Test -  
Hamstring Cord Tightness - Examination Right Elbow Skin Intact Range of Motion 0-135 Tenderness Medial Epicondyle - Tenderness Lateral Epicondyle - Tenderness Olecranon Bursa - Swelling - Bruising - Stability Normal  
Motor Strength  Normal  
Neurovascular Intact IMAGING: XR of left knee dated 2/7/19 was reviewed and read: Mildly decreased joint space on the medial side. XR of right elbow dated 2/7/19 was reviewed and read: small oleceraon spur IMPRESSION:   
  ICD-10-CM ICD-9-CM 1. Contusion of right elbow, initial encounter S50. 01XA 923.11   
2. Acute pain of left knee M25.562 719.46 AMB POC XRAY, KNEE; 1/2 VIEWS 3. Right elbow pain M25.521 719.42 AMB POC XRAY, ELBOW; COMPLETE, 3+ VIE 4. Pes anserinus bursitis of right knee M70.51 726.61 PLAN:  
1. The patient presents today with left knee and right elbow pain due to a contusion and bursitis. Risk factors include: htn 2. No ultrasound exam indicated today 3. No cortisone injection indicated today 4. No Physical/Occupational Therapy indicated today 5. No diagnostic test indicated today: 6. No durable medical equipment indicated today 7. No referral indicated today 8. Yes medications indicated today: TRAMADOL 9. Yes Narcotic indicated today for short term acute pain secondary to bone contusion. Patient given pain medication for short term acute pain relief. Goal is to treat patient according to above plan and to ultimately have patient off all pain medications once appropriate. If chronic pain management is required beyond what is expected for current orthopedic problem, will refer patient to pain management.   was reviewed and will be reviewed with every medication refill request.  
 
 
RTC prn Follow-up Disposition: Not on File Office note will be sent to referring provider. Scribed by Christiana Kennedy ([address]) as dictated by MD MEAGAN Bhatti, Dr. Lenny Marin, confirm that all documentation is accurate.  
 
Lenny Marin M.D.  
[facility]

## 2019-03-19 NOTE — PROGRESS NOTES
Patient is in the office today for 6 month follow up. 1. Have you been to the ER, urgent care clinic since your last visit? Hospitalized since your last visit? No    2. Have you seen or consulted any other health care providers outside of the [address] [place] [address] since your last visit? Include any pap smears or colon screening.  No

## 2019-03-19 NOTE — PROGRESS NOTES
This is a \"Welcome to [employer]"  Initial Preventive Physical Examination (IPPE) providing Personalized Prevention Plan Services (Performed in the first 12 months of enrollment)    I have reviewed the patient's medical history in detail and updated the computerized patient record. History     Past Medical History:   Diagnosis Date    Adenocarcinoma of esophagus, stage 4 ([address] 6/4/2018    Agatston CAC score 200-399 12/03/2015    Coronary calcium score 331.  Essential hypertension, benign     History of tobacco use     quit 1/2016    Hoarseness of voice     Joint pain     Mediastinal mass     Pure hypercholesterolemia     Sleep apnea     CPAP    Tobacco dependency       Past Surgical History:   Procedure Laterality Date    HX OTHER SURGICAL Right 2011    Removal of shoulder bone spur     HX VASCULAR ACCESS      mediport left shoulder     Current Outpatient Medications   Medication Sig Dispense Refill    atorvastatin (LIPITOR) 80 mg tablet TAKE 1 TABLET BY MOUTH DAILY 90 Tab 3    montelukast (SINGULAIR) 10 mg tablet Take 1 Tab by mouth daily. 30 Tab 5    chlorhexidine (PERIDEX) 0.12 % solution SWISH 1 CAPFUL FOR ONE MINUTE AND SPIT OUT THREE TIMES A DAY  5    lisinopril-hydroCHLOROthiazide (PRINZIDE, ZESTORETIC) 20-25 mg per tablet TAKE 1 TABLET BY MOUTH ONCE DAILY 90 Tab 3    prochlorperazine (COMPAZINE) 10 mg tablet Take 10 mg by mouth.  ondansetron hcl (ZOFRAN) 8 mg tablet Take 8 mg by mouth.  cetirizine (ZYRTEC) 10 mg tablet Take 1 Tab by mouth daily. 30 Tab 0    docusate sodium (COLACE) 100 mg capsule Take 100 mg by mouth daily.  traMADol (ULTRAM) 50 mg tablet Take 1 Tab by mouth every six (6) hours as needed for Pain. Max Daily Amount: 200 mg. 28 Tab 0    guaiFENesin-codeine (CHERATUSSIN AC) 100-10 mg/5 mL solution Take 10 mL by mouth four (4) times daily as needed for Cough.  Max Daily Amount: 40 mL. 120 mL 0    oxyCODONE IR (ROXICODONE) 5 mg immediate release tablet Take 1-2 Tabs by mouth every four (4) hours as needed for Pain. Max Daily Amount: 60 mg. 30 Tab 0     Allergies   Allergen Reactions    Iodine And Iodide Containing Products Nausea and Vomiting    Tetracycline Itching     Family History   Problem Relation Age of Onset    Kidney Disease Mother     Diabetes Father     Heart Failure Father         CHF    Heart Attack Brother     Stroke Brother     Diabetes Brother      Social History     Tobacco Use    Smoking status: Former Smoker     Packs/day: 1.00     Years: 20.00     Pack years: 20.00     Types: Cigarettes     Last attempt to quit: 1/2/2016     Years since quitting: 3.2    Smokeless tobacco: Never Used   Substance Use Topics    Alcohol use: Yes     Comment: occassionally/RARELY     Diet, Lifestyle: The patient is prescribed and does not follow the special diet. Exercise level: moderately active    Depression Risk Screen     3 most recent PHQ Screens 3/19/2019   Little interest or pleasure in doing things Not at all   Feeling down, depressed, irritable, or hopeless Not at all   Total Score PHQ 2 0     Alcohol Risk Screen   You do not drink alcohol or very rarely. Functional Ability and Level of Safety   Hearing Loss  Hearing is good. Vision Screening  Vision is good. Visual Acuity Screening    Right eye Left eye Both eyes   Without correction:      With correction: 20/30 20/20 20/15         Activities of Daily Living  The home contains: no safety equipment. Patient does total self care    Fall Risk Screen  Fall Risk Assessment, last 12 mths 3/19/2019   Able to walk? Yes   Fall in past 12 months?  No   Fall with injury? -   Number of falls in past 12 months -   Fall Risk Score -       Abuse Screen  Patient is not abused    Screening EKG   EKG order placed: No    Patient Care Team   Patient Care Team:  Sybil Gonzalez MD as PCP - General  Rebel Murray DO (Cardiology)  Joyce Villeda MD (Sleep Medicine)  Jocelyn Currie MD (Ophthalmology)  Navin Lyles MD (Hematology and Oncology)     Glaucoma Screening-  Dr Ewing Curling   Pneumonia Vaccine-  PCV-13 on hold per oncology   Shingles Vaccine- needs Shingrix. Oncology to give ok  Tdap Vaccine- 12/2014 next due 12/2021  Colonoscopy-  Done in past by Dr Siena Reaves and due but Oncology says it not a option at this time due to treatment of Esophageal cancer   PSA- 3/11/19 1.8  Advance Directive-   Pt to discuss with his wife. End of Life Planning   Advanced care planning directives were discussed with the patient and /or family/caregiver. Assessment/Plan   Education and counseling provided:  Are appropriate based on today's review and evaluation  End-of-Life planning (with patient's consent)  Pneumococcal Vaccine    Diagnoses and all orders for this visit:    1. Welcome to Medicare preventive visit  -     AMB POC EKG ROUTINE W/ 12 LEADS, SCREEN ()    2. Essential hypertension, benign  -     METABOLIC PANEL, COMPREHENSIVE; Future    3. Pure hypercholesterolemia  -     LIPID PANEL; Future    4. Adenocarcinoma of esophagus, stage 4 [facility])        Health Maintenance Due   Topic Date Due    Shingrix Vaccine Age 49> (1 of 2) 11/26/2003    GLAUCOMA SCREENING Q2Y  11/26/2018    Pneumococcal 65+ High/Highest Risk (1 of 2 - PCV13) 11/26/2018    AAA Screening 73-69 YO Male Smoking Patients  11/26/2018     A comprehensive 5 year plan for medical care and screening exams was reviewed with pt and they received a copy of it.

## 2019-03-19 NOTE — PATIENT INSTRUCTIONS
Hyperlipidemia: After Your Visit  Your Care Instructions  Hyperlipidemia is too much fat in your blood. The body has several kinds of fat, including cholesterol and triglycerides. Your body needs fat for many things, such as making new cells. But too much fat in your blood increases your chances of having a heart attack or stroke. You may be able to lower your cholesterol and triglycerides with a heart-healthy diet, exercise, and if needed, medicine. Your doctor may want you to try lifestyle changes first to see whether they lower the fat in your blood. You may need to take medicine if lifestyle changes do not lower the fat in your blood enough. Follow-up care is a key part of your treatment and safety. Be sure to make and go to all appointments, and call your doctor if you are having problems. Its also a good idea to know your test results and keep a list of the medicines you take. How can you care for yourself at home? Take your medicines  · Take your medicines exactly as prescribed. Call your doctor if you think you are having a problem with your medicine. · If you take medicine to lower your cholesterol, go to follow-up visits. You will need to have blood tests. · Do not take large doses of niacin, which is a B vitamin, while taking medicine called statins. It may increase the chance of muscle pain and liver problems. · Talk to your doctor about avoiding grapefruit juice if you are taking statins. Grapefruit juice can raise the level of this medicine in your blood. This could increase side effects. Eat more fruits, vegetables, and fiber  · Fruits and vegetables have lots of nutrients that help protect against heart disease, and they have littleif anyfat. Try to eat at least five servings a day. Dark green, deep orange, or yellow fruits and vegetables are healthy choices. · Keep carrots, celery, and other veggies handy for snacks.  Buy fruit that is in season and store it where you can see it so that you will be tempted to eat it. Cook dishes that have a lot of veggies in them, such as stir-fries and soups. · Foods high in fiber may reduce your cholesterol and provide important vitamins and minerals. High-fiber foods include whole-grain cereals and breads, oatmeal, beans, brown rice, citrus fruits, and apples. · Buy whole-grain breads and cereals instead of white bread and pastries. Limit saturated fat  · Read food labels and try to avoid saturated fat and trans fat. They increase your risk of heart disease. · Use olive or canola oil when you cook. Try cholesterol-lowering spreads, such as Benecol or Take Control. · Bake, broil, grill, or steam foods instead of frying them. · Limit the amount of high-fat meats you eat, including hot dogs and sausages. Cut out all visible fat when you prepare meat. · Eat fish, skinless poultry, and soy products such as tofu instead of high-fat meats. Soybeans may be especially good for your heart. Eat at least two servings of fish a week. Certain fish, such as salmon, contain omega-3 fatty acids, which may help reduce your risk of heart attack. · Choose low-fat or fat-free milk and dairy products. Get exercise, limit alcohol, and quit smoking  · Get more exercise. Work with your doctor to set up an exercise program. Even if you can do only a small amount, exercise will help you get stronger, have more energy, and manage your weight and your stress. Walking is an easy way to get exercise. Gradually increase the amount you walk every day. Aim for at least 30 minutes on most days of the week. You also may want to swim, bike, or do other activities. · Limit alcohol to no more than 2 drinks a day for men and 1 drink a day for women. · Do not smoke. If you need help quitting, talk to your doctor about stop-smoking programs and medicines. These can increase your chances of quitting for good. When should you call for help?   Call 911 anytime you think you may need emergency care. For example, call if:  · You have symptoms of a heart attack. These may include:  ¨ Chest pain or pressure, or a strange feeling in the chest.  ¨ Sweating. ¨ Shortness of breath. ¨ Nausea or vomiting. ¨ Pain, pressure, or a strange feeling in the back, neck, jaw, or upper belly or in one or both shoulders or arms. ¨ Lightheadedness or sudden weakness. ¨ A fast or irregular heartbeat. After you call 911, the  may tell you to chew 1 adult-strength or 2 to 4 low-dose aspirin. Wait for an ambulance. Do not try to drive yourself. · You have signs of a stroke. These may include:  ¨ Sudden numbness, paralysis, or weakness in your face, arm, or leg, especially on only one side of your body. ¨ New problems with walking or balance. ¨ Sudden vision changes. ¨ Drooling or slurred speech. ¨ New problems speaking or understanding simple statements, or feeling confused. ¨ A sudden, severe headache that is different from past headaches. · You passed out (lost consciousness). Call your doctor now or seek immediate medical care if:  · You have muscle pain or weakness. Watch closely for changes in your health, and be sure to contact your doctor if:  · You are very tired. · You have an upset stomach, gas, constipation, or belly pain or cramps. Where can you learn more? Go to [employer]  Enter C406 in the search box to learn more about \"Hyperlipidemia: After Your Visit. \"   © 3013-6687 Healthwise, Incorporated. Care instructions adapted under license by [facility] (which disclaims liability or warranty for this information). This care instruction is for use with your licensed healthcare professional. If you have questions about a medical condition or this instruction, always ask your healthcare professional. [place] [address] any warranty or liability for your use of this information.   Content Version: 4.1.237211; Last Revised: October 13, 2011              Medicare Wellness Visit, Male    The best way to live healthy is to have a lifestyle where you eat a well-balanced diet, exercise regularly, limit alcohol use, and quit all forms of tobacco/nicotine, if applicable. Regular preventive services are another way to keep healthy. Preventive services (vaccines, screening tests, monitoring & exams) can help personalize your care plan, which helps you manage your own care. Screening tests can find health problems at the earliest stages, when they are easiest to treat. [facility] follows the current, evidence-based guidelines published by the [facility] Yariel Garcia ([facility]) when recommending preventive services for our patients. Because we follow these guidelines, sometimes recommendations change over time as research supports it. (For example, a prostate screening blood test is no longer routinely recommended for men with no symptoms.)  Of course, you and your doctor may decide to screen more often for some diseases, based on your risk and co-morbidities (chronic disease you are already diagnosed with). Preventive services for you include:  - Medicare offers their members a free annual wellness visit, which is time for you and your primary care provider to discuss and plan for your preventive service needs. Take advantage of this benefit every year!  -All adults over age 72 should receive the recommended pneumonia vaccines. Current USPSTF guidelines recommend a series of two vaccines for the best pneumonia protection.   -All adults should have a flu vaccine yearly and an ECG.  All adults age 61 and older should receive a shingles vaccine once in their lifetime.    -All adults age 38-68 who are overweight should have a diabetes screening test once every three years.   -Other screening tests & preventive services for persons with diabetes include: an eye exam to screen for diabetic retinopathy, a kidney function test, a foot exam, and stricter control over your cholesterol.   -Cardiovascular screening for adults with routine risk involves an electrocardiogram (ECG) at intervals determined by the provider.   -Colorectal cancer screening should be done for adults age 54-65 with no increased risk factors for colorectal cancer. There are a number of acceptable methods of screening for this type of cancer. Each test has its own benefits and drawbacks. Discuss with your provider what is most appropriate for you during your annual wellness visit. The different tests include: colonoscopy (considered the best screening method), a fecal occult blood test, a fecal DNA test, and sigmoidoscopy.  -All adults born between [facility] should be screened once for Hepatitis C.  -An Abdominal Aortic Aneurysm (AAA) Screening is recommended for men age 73-68 who has ever smoked in their lifetime.      Here is a list of your current Health Maintenance items (your personalized list of preventive services) with a due date:  Health Maintenance Due   Topic Date Due    Shingles Vaccine (1 of 2) 11/26/2003    Glaucoma Screening   11/26/2018    Pneumococcal Vaccine (1 of 2 - PCV13) 11/26/2018    AAA Screening  11/26/2018

## 2019-03-19 NOTE — PROGRESS NOTES
Subjective:       Chief Complaint  The patient presents for follow up of hypertension and high cholesterol. Obesity, CAD, stage 4 esophageal cancer. HPI  Luis Jean-Baptiste is a 70 y.o. male seen for follow up of hyperlipidemia. Healso has hypertension. Hypertension well controlled, no significant medication side effects noted, on Zestoretic, hyperlipidemia uncontrolled due to poor compliance with taking Lipitor 80 mg, no significant medication side effects noted, on Lipitor 80 mg but with pt having CAC score 331 he needs to get LDL<70. Pt has stopped tobacco use. He was advised to be more consistent with taking Lipitor 80 mg. Diet and Lifestyle: not attempting to follow a low fat, low cholesterol diet, exercises sporadically    Home BP Monitoring: is not measured at home. Other symptoms and concerns: pt has  stage 4 esophageal cancer. He is working with his oncologist and taking chemotherapy. Pt has a excellent mental attitude. Current Outpatient Medications   Medication Sig    atorvastatin (LIPITOR) 80 mg tablet TAKE 1 TABLET BY MOUTH DAILY    montelukast (SINGULAIR) 10 mg tablet Take 1 Tab by mouth daily.  chlorhexidine (PERIDEX) 0.12 % solution SWISH 1 CAPFUL FOR ONE MINUTE AND SPIT OUT THREE TIMES A DAY    lisinopril-hydroCHLOROthiazide (PRINZIDE, ZESTORETIC) 20-25 mg per tablet TAKE 1 TABLET BY MOUTH ONCE DAILY    prochlorperazine (COMPAZINE) 10 mg tablet Take 10 mg by mouth.  ondansetron hcl (ZOFRAN) 8 mg tablet Take 8 mg by mouth.  cetirizine (ZYRTEC) 10 mg tablet Take 1 Tab by mouth daily.  docusate sodium (COLACE) 100 mg capsule Take 100 mg by mouth daily.  traMADol (ULTRAM) 50 mg tablet Take 1 Tab by mouth every six (6) hours as needed for Pain. Max Daily Amount: 200 mg.    guaiFENesin-codeine (CHERATUSSIN AC) 100-10 mg/5 mL solution Take 10 mL by mouth four (4) times daily as needed for Cough. Max Daily Amount: 40 mL.     oxyCODONE IR (ROXICODONE) 5 mg immediate release tablet Take 1-2 Tabs by mouth every four (4) hours as needed for Pain. Max Daily Amount: 60 mg. No current facility-administered medications for this visit. Review of Systems  Respiratory: negative for dyspnea on exertion  Cardiovascular: negative for chest pain    Objective:     Visit Vitals  /74 (BP 1 Location: Left arm, BP Patient Position: Sitting)   Pulse 98   Temp 98.4 °F (36.9 °C) (Oral)   Resp 20   Ht 5' 11\" (1.803 m)   Wt 245 lb (111.1 kg)   SpO2 95%   BMI 34.17 kg/m²        General appearance - alert, well appearing, and in no distress  Neck - supple, no significant adenopathy, carotids upstroke normal bilaterally, no bruits  Chest - clear to auscultation, no wheezes, rales or rhonchi, symmetric air entry  Heart - normal rate, regular rhythm, normal S1, S2, no murmurs, rubs, clicks or gallops  Extremities - peripheral pulses normal, no pedal edema, no clubbing or cyanosis  Skin - normal coloration and turgor, no rashes, no suspicious skin lesions noted      Labs:   Lab Results   Component Value Date/Time    Cholesterol, total 184 03/11/2019 09:34 PM    HDL Cholesterol 39 (L) 03/11/2019 09:34 PM    LDL, calculated 126 (H) 03/11/2019 09:34 PM    Triglyceride 96 03/11/2019 09:34 PM    CHOL/HDL Ratio 3.1 12/04/2015 09:42 AM     Lab Results   Component Value Date/Time    ALT (SGPT) 26 03/11/2019 09:34 PM    AST (SGOT) 19 03/11/2019 09:34 PM    Alk.  phosphatase 63 03/11/2019 09:34 PM    Bilirubin, total 0.3 03/11/2019 09:34 PM     Lab Results   Component Value Date/Time    GFR est AA >60 06/06/2018 12:20 PM    GFR est non-AA >60 06/06/2018 12:20 PM    Creatinine 0.9 03/11/2019 09:34 PM    BUN 15 03/11/2019 09:34 PM    Sodium 146 (H) 03/11/2019 09:34 PM    Potassium 3.9 03/11/2019 09:34 PM    Chloride 104 03/11/2019 09:34 PM    CO2 22 03/11/2019 09:34 PM            Assessment / Plan     Hypertension well controlled, on current meds  Hyperlipidemia borderline controlled, on Lipitor 80 mg daily. Pt will try to take the medication more consistently. ICD-10-CM ICD-9-CM    1. Welcome to Medicare preventive visit Z00.00 V70.0 AMB POC EKG ROUTINE W/ 12 LEADS, SCREEN ()   2. Essential hypertension, benign Y03 159.0 METABOLIC PANEL, COMPREHENSIVE   3. Pure hypercholesterolemia E78.00 272.0 LIPID PANEL   4. Adenocarcinoma of esophagus, stage 4 (HCC) C15.9 150.9 Pt remains on chemo per oncology. He is tolerating it fairly well. Low cholesterol diet, weight control and daily exercise discussed. Follow-up Disposition:  Return in about 6 months (around 9/19/2019) for labs 1 week before. Reviewed plan of care. Patient has provided input and agrees with goals. Discussed the patient's BMI with him. The BMI follow up plan is as follows:     dietary management education, guidance, and counseling  encourage exercise  monitor weight  prescribed dietary intake    An After Visit Summary was printed and given to the patient.

## 2019-07-05 NOTE — TELEPHONE ENCOUNTER
Cassie states patients BP good until today 170/100, Cassie states patient does not eat and he has been receiving hydration every other day. Yadira Brownlee states she did not know if the sodium from the hydration was causing his BP to be elevated. Danii Lindo states when patient was diagnosed with Brain mets and had emergency radiation treatment. Yadira Brownlee states patient could not finish treatment. Wade Nancy states patient just lays in the bed and   is out of med. Cassie states pt  110/70 and 120/70 all this week until today. Yadira Brownlee states he had been off medication since last Friday. Yadira Brownlee states wife did go to the pharmacy and get BP med today and was told to give him a pill for his BP today.

## 2019-07-05 NOTE — TELEPHONE ENCOUNTER
Would just monitor BP for now and would not give any BP meds unless SBP>200 than can give the Zestoretic he has

## 2019-07-05 NOTE — TELEPHONE ENCOUNTER
Glorine Landau is aware to monitor BP for now and would not give patient any BP meds unless his SBP>200 than he can give the Zestoretic he has. Cassie verbalizes understanding.

## 2019-07-05 NOTE — TELEPHONE ENCOUNTER
Dima Jeffers is a Home care nurse calling on behalf of the pt, the pt's bp has been good so they have not giving it him b/c it has the fluid in with it and they have been giving him fluids. Dima Jeffers is asking for the provider to put in a bp medication for the pt without fluid in it.    [phone]

## 2019-07-08 PROBLEM — R06.03 RESPIRATORY DISTRESS: Status: ACTIVE | Noted: 2019-01-01

## (undated) DEVICE — SHEET, DRAPE, SPLIT, STERILE: Brand: MEDLINE

## (undated) DEVICE — (D)GLOVE SURG TRIFLX 8 PWD LTX -- DISC BY MFR USE ITEM 302994

## (undated) DEVICE — GAUZE SPONGES,16 PLY: Brand: CURITY

## (undated) DEVICE — SUT CHRMC 3-0 27IN SH BRN --

## (undated) DEVICE — PACK PROCEDURE SURG MAJ W/ BASIN LF

## (undated) DEVICE — INTENDED FOR TISSUE SEPARATION, AND OTHER PROCEDURES THAT REQUIRE A SHARP SURGICAL BLADE TO PUNCTURE OR CUT.: Brand: BARD-PARKER SAFETY BLADES SIZE 10, STERILE

## (undated) DEVICE — SOLUTION IV 1000ML 0.9% SOD CHL

## (undated) DEVICE — GOWN,REINFORCED,POLY,AURORA,XLARGE,STRL: Brand: MEDLINE

## (undated) DEVICE — SYRINGE MED 20ML STD CLR PLAS LUERLOCK TIP N CTRL DISP

## (undated) DEVICE — 3M™ BAIR PAWS FLEX™ WARMING GOWN, STANDARD, 20 PER CASE 81003: Brand: BAIR PAWS™

## (undated) DEVICE — 10FR FRAZIER SUCTION HANDLE: Brand: CARDINAL HEALTH

## (undated) DEVICE — SOFT SILICONE HYDROCELLULAR SACRUM DRESSING WITH LOCK AWAY LAYER: Brand: ALLEVYN LIFE SACRUM (LARGE) PACK OF 10

## (undated) DEVICE — KIT CLN UP BON SECOURS MARYV

## (undated) DEVICE — NEEDLE HYPO 27GA L1.25IN GRY POLYPR HUB S STL REG BVL STR

## (undated) DEVICE — DRAPE TWL SURG 16X26IN BLU ORB04] ALLCARE INC]

## (undated) DEVICE — KENDALL SCD EXPRESS SLEEVES, KNEE LENGTH, MEDIUM: Brand: KENDALL SCD

## (undated) DEVICE — MAYO STAND COVER: Brand: CONVERTORS

## (undated) DEVICE — BLADE TNGE REG 6IN WOOD STRL -- CONVERT TO ITEM 153408